# Patient Record
Sex: FEMALE | Race: WHITE | Employment: FULL TIME | URBAN - METROPOLITAN AREA
[De-identification: names, ages, dates, MRNs, and addresses within clinical notes are randomized per-mention and may not be internally consistent; named-entity substitution may affect disease eponyms.]

---

## 2020-08-24 ENCOUNTER — TELEPHONE (OUTPATIENT)
Dept: INTERNAL MEDICINE CLINIC | Age: 60
End: 2020-08-24

## 2020-08-24 RX ORDER — ONDANSETRON 4 MG/1
4 TABLET, ORALLY DISINTEGRATING ORAL
Qty: 15 TAB | Refills: 2 | Status: SHIPPED | OUTPATIENT
Start: 2020-08-24 | End: 2021-02-19 | Stop reason: ALTCHOICE

## 2020-10-19 ENCOUNTER — OFFICE VISIT (OUTPATIENT)
Dept: INTERNAL MEDICINE CLINIC | Age: 60
End: 2020-10-19
Payer: COMMERCIAL

## 2020-10-19 VITALS
BODY MASS INDEX: 42.51 KG/M2 | DIASTOLIC BLOOD PRESSURE: 98 MMHG | TEMPERATURE: 97.6 F | WEIGHT: 249 LBS | HEIGHT: 64 IN | RESPIRATION RATE: 22 BRPM | SYSTOLIC BLOOD PRESSURE: 150 MMHG

## 2020-10-19 DIAGNOSIS — R03.0 ELEVATED BLOOD PRESSURE READING: Primary | ICD-10-CM

## 2020-10-19 PROCEDURE — 99213 OFFICE O/P EST LOW 20 MIN: CPT | Performed by: INTERNAL MEDICINE

## 2020-10-19 RX ORDER — LEVOTHYROXINE SODIUM 100 UG/1
1 TABLET ORAL DAILY
COMMUNITY
Start: 2020-08-07 | End: 2021-05-24

## 2020-10-19 RX ORDER — ASPIRIN 325 MG
325 TABLET ORAL DAILY
COMMUNITY
End: 2022-07-01 | Stop reason: ALTCHOICE

## 2020-10-19 RX ORDER — LOSARTAN POTASSIUM AND HYDROCHLOROTHIAZIDE 12.5; 1 MG/1; MG/1
1 TABLET ORAL DAILY
Qty: 30 TAB | Refills: 5 | Status: SHIPPED | OUTPATIENT
Start: 2020-10-19 | End: 2021-02-19

## 2020-10-19 RX ORDER — PANTOPRAZOLE SODIUM 40 MG/1
1 TABLET, DELAYED RELEASE ORAL DAILY
COMMUNITY
Start: 2020-10-01 | End: 2021-04-19

## 2020-10-19 NOTE — PROGRESS NOTES
Prem Montes presents today for   Chief Complaint   Patient presents with    Thyroid Problem       Is someone accompanying this pt? no    Is the patient using any DME equipment during [address]? no    Depression Screening:  3 most recent PHQ Screens 10/19/2020   Little interest or pleasure in doing things Not at all   Feeling down, depressed, irritable, or hopeless Not at all   Total Score PHQ 2 0       Learning Assessment:  Learning Assessment 10/19/2020   PRIMARY LEARNER Patient   HIGHEST LEVEL OF EDUCATION - PRIMARY LEARNER  DID NOT GRADUATE [facility] PRIMARY LEARNER NONE   CO-LEARNER CAREGIVER No   PRIMARY LANGUAGE ENGLISH   LEARNER PREFERENCE PRIMARY DEMONSTRATION   ANSWERED BY patient   RELATIONSHIP SELF       Abuse Screening:  Abuse Screening Questionnaire 10/19/2020   Do you ever feel afraid of your partner? N   Are you in a relationship with someone who physically or mentally threatens you? N   Is it safe for you to go home? Y       Fall Risk  Fall Risk Assessment, last 12 mths 10/19/2020   Able to walk? Yes   Fall in past 12 months? No       ADL  ADL Assessment 10/19/2020   Feeding yourself No Help Needed   Getting from bed to chair No Help Needed   Getting dressed No Help Needed   Bathing or showering No Help Needed   Walk across the room (includes cane/walker) No Help Needed   Using the telphone No Help Needed   Taking your medications No Help Needed   Preparing meals No Help Needed   Managing money (expenses/bills) No Help Needed   Moderately strenuous housework (laundry) No Help Needed   Shopping for personal items (toiletries/medicines) No Help Needed   Shopping for groceries No Help Needed   Driving No Help Needed   Climbing a flight of stairs No Help Needed   Getting to places beyond walking distances No Help Needed       Health Maintenance reviewed and discussed and ordered per Provider.     Health Maintenance Due   Topic Date Due    Hepatitis C Screening  1960    DTaP/Tdap/Td series (1 - Tdap) 04/09/1979    PAP AKA CERVICAL CYTOLOGY  04/09/1979    Lipid Screen  04/09/1998    Shingrix Vaccine Age 50> (1 of 2) 04/09/2008    Breast Cancer Screen Mammogram  04/09/2008    FOBT Q1Y Age 50-75  04/09/2008    Flu Vaccine (1) 09/01/2020   . Coordination of Care:  1. Have you been to the ER, urgent care clinic since your last visit? Hospitalized since your last visit? yes    2. Have you seen or consulted any other health care providers outside of the [address] [place] [address] since your last visit? Include any pap smears or colon screening.  no

## 2020-10-19 NOTE — PROGRESS NOTES
Angélica Wilkinson is a 56 y.o. female presenting for           Chief Complaint   Patient presents with    Thyroid Problem        HPI Beautruby Argueta comes in today for routine follow-up but she is having some problems with her lower back. He has recently recovered from a bout with COVID-19. She went to the emergency room and had to get some fluids. Fortunately she is better and back at work. She says the only residual she has is when she takes a deep breath she feels like her lungs are restricted somehow. She is not really short of breath and has no dyspnea on exertion. She is able to do her work. She would like to take something for her low back pain and degenerative back disease and arthritis. She took diclofenac before with some relief but not complete and wonders if there is anything else in any stronger. I would like to have started her on some naproxen would be easiest on her blood pressure and so forth but her blood pressure remained consistently high during the office visit. No past medical history on file. Current Outpatient Medications on File Prior to Visit   Medication Sig Dispense Refill    aspirin (ASPIRIN) 325 mg tablet Take 325 mg by mouth daily.  levothyroxine (SYNTHROID) 100 mcg tablet Take 1 Tab by mouth daily.  pantoprazole (PROTONIX) 40 mg tablet Take 1 Tab by mouth daily.  ondansetron (ZOFRAN ODT) 4 mg disintegrating tablet Take 1 Tab by mouth every eight (8) hours as needed for Nausea or Vomiting. 15 Tab 2     No current facility-administered medications on file prior to visit. ROS all systems are reviewed and negative except as noted in the history of present illness.     Visit Vitals  BP (!) 150/98 (BP 1 Location: Left arm, BP Patient Position: Sitting)   Temp 97.6 °F (36.4 °C)   Resp 22   Ht 5' 4\" (1.626 m)   Wt 249 lb (112.9 kg)   BMI 42.74 kg/m²        Physical Exam obese  woman alert and oriented no acute distress normocephalic Activa pink sclera anicteric oral mucosa not examined neck no lymphadenopathy lungs were clear to auscultation heart rhythm regular trace ankle edema. Assessment & Plan  Little concerned that her blood pressure is elevated I am going to start her on ARB as well as a mild diuretic. I will recheck her in a month. At that point we will consider giving her something for her arthritis.

## 2020-11-20 ENCOUNTER — OFFICE VISIT (OUTPATIENT)
Dept: INTERNAL MEDICINE CLINIC | Age: 60
End: 2020-11-20
Payer: COMMERCIAL

## 2020-11-20 VITALS
WEIGHT: 255 LBS | HEART RATE: 98 BPM | BODY MASS INDEX: 43.54 KG/M2 | HEIGHT: 64 IN | DIASTOLIC BLOOD PRESSURE: 80 MMHG | RESPIRATION RATE: 20 BRPM | SYSTOLIC BLOOD PRESSURE: 118 MMHG

## 2020-11-20 DIAGNOSIS — I10 ESSENTIAL HYPERTENSION: ICD-10-CM

## 2020-11-20 DIAGNOSIS — M54.9 BACK PAIN, UNSPECIFIED BACK LOCATION, UNSPECIFIED BACK PAIN LATERALITY, UNSPECIFIED CHRONICITY: Primary | ICD-10-CM

## 2020-11-20 PROCEDURE — 99213 OFFICE O/P EST LOW 20 MIN: CPT | Performed by: INTERNAL MEDICINE

## 2020-11-20 RX ORDER — IBUPROFEN 200 MG
4 TABLET ORAL DAILY
COMMUNITY
End: 2021-03-30

## 2020-11-20 NOTE — PROGRESS NOTES
Cathi Quezada presents today for   Chief Complaint   Patient presents with    Hypertension       Is someone accompanying this pt? no    Is the patient using any DME equipment during OV? no    Depression Screening:  3 most recent PHQ Screens 11/20/2020   Little interest or pleasure in doing things Not at all   Feeling down, depressed, irritable, or hopeless Not at all   Total Score PHQ 2 0       Learning Assessment:  Learning Assessment 11/20/2020   PRIMARY LEARNER Patient   HIGHEST LEVEL OF EDUCATION - PRIMARY LEARNER  DID NOT GRADUATE HIGH SCHOOL   BARRIERS PRIMARY LEARNER NONE   CO-LEARNER CAREGIVER No   PRIMARY LANGUAGE ENGLISH   LEARNER PREFERENCE PRIMARY DEMONSTRATION   ANSWERED BY patient   RELATIONSHIP SELF       Abuse Screening:  Abuse Screening Questionnaire 11/20/2020   Do you ever feel afraid of your partner? N   Are you in a relationship with someone who physically or mentally threatens you? N   Is it safe for you to go home? Y       Fall Risk  Fall Risk Assessment, last 12 mths 11/20/2020   Able to walk? Yes   Fall in past 12 months? No       ADL  ADL Assessment 11/20/2020   Feeding yourself No Help Needed   Getting from bed to chair No Help Needed   Getting dressed No Help Needed   Bathing or showering No Help Needed   Walk across the room (includes cane/walker) No Help Needed   Using the telphone No Help Needed   Taking your medications No Help Needed   Preparing meals No Help Needed   Managing money (expenses/bills) No Help Needed   Moderately strenuous housework (laundry) No Help Needed   Shopping for personal items (toiletries/medicines) No Help Needed   Shopping for groceries No Help Needed   Driving No Help Needed   Climbing a flight of stairs No Help Needed   Getting to places beyond walking distances No Help Needed       Health Maintenance reviewed and discussed and ordered per Provider.     Health Maintenance Due   Topic Date Due    Hepatitis C Screening  1960    DTaP/Tdap/Td series (1 - Tdap) 04/09/1979    PAP AKA CERVICAL CYTOLOGY  04/09/1979    Lipid Screen  04/09/1998    Shingrix Vaccine Age 50> (1 of 2) 04/09/2008    Colorectal Cancer Screening Combo  04/09/2008    Breast Cancer Screen Mammogram  04/09/2008    Flu Vaccine (1) 09/01/2020   . Coordination of Care:  1. Have you been to the ER, urgent care clinic since your last visit? Hospitalized since your last visit? no    2. Have you seen or consulted any other health care providers outside of the [address] [place] [address] since your last visit? Include any pap smears or colon screening.  no

## 2020-11-20 NOTE — PROGRESS NOTES
Kavita Woods is a 56 y.o. female presenting for hypertension follow-up        Chief Complaint   Patient presents with    Hypertension    Back Pain        HPI Jose Duron comes in and by her report her blood pressure is dramatically improved on losartan HCT. She says she is getting systolics in the 628 range. She says early on she might of had a little bit of dizziness and orthostasis but that has resolved and she feels very well. With regard to her low back pain that continues and it is a major issue right now. She works on the [address] [employer] at [employer] she is creaky when she first gets up out of bed but by the time the middle of the day rolls around particularly if she is done a lot of vacuuming having increased low back pain radiating down both legs. She continues to do her home exercise program as instructed by the physical therapists. She has not had bladder or bowel dysfunction but she would be interested in a referral.  No past medical history on file. Current Outpatient Medications on File Prior to Visit   Medication Sig Dispense Refill    ibuprofen (MOTRIN) 200 mg tablet Take 4 Tabs by mouth daily.  aspirin (ASPIRIN) 325 mg tablet Take 325 mg by mouth daily.  levothyroxine (SYNTHROID) 100 mcg tablet Take 1 Tab by mouth daily.  pantoprazole (PROTONIX) 40 mg tablet Take 1 Tab by mouth daily.  losartan-hydroCHLOROthiazide (HYZAAR) 100-12.5 mg per tablet Take 1 Tab by mouth daily for 180 days. 30 Tab 5    ondansetron (ZOFRAN ODT) 4 mg disintegrating tablet Take 1 Tab by mouth every eight (8) hours as needed for Nausea or Vomiting. 15 Tab 2     No current facility-administered medications on file prior to visit.          ROS except as noted in the history of present illness all systems are reviewed and negative  Visit Vitals  /80 (BP 1 Location: Right arm, BP Patient Position: Sitting)   Pulse 98   Resp 20   Ht 5' 4\" (1.626 m)   Wt 255 lb (115.7 kg)   BMI 43.77 kg/m²        Physical Exam well-developed overweight  woman alert oriented cooperative no acute distress normocephalic conjunctiva pink sclera anicteric neck supple no lymphadenopathy lungs bilaterally clear heart regular rhythm no gallops or extrasystoles oxygen saturation is 98%      Assessment & Plan she still feels like she is having some effects from her bout with COVID-19. Fortunately she has recovered in every other way her other than dyspnea on exertion. She just does not feel like she is quite gotten back her usual lung capacity. I explained to her that it would probably be a long time. As noted she is interested in a referral and I am going to send a referral to Dr. Dane Kirk. I will recheck her blood pressure in about 3 months.

## 2021-01-04 ENCOUNTER — OFFICE VISIT (OUTPATIENT)
Dept: ORTHOPEDIC SURGERY | Age: 61
End: 2021-01-04
Payer: COMMERCIAL

## 2021-01-04 VITALS
DIASTOLIC BLOOD PRESSURE: 90 MMHG | TEMPERATURE: 97.2 F | BODY MASS INDEX: 48.21 KG/M2 | HEIGHT: 62 IN | OXYGEN SATURATION: 100 % | RESPIRATION RATE: 17 BRPM | WEIGHT: 262 LBS | SYSTOLIC BLOOD PRESSURE: 192 MMHG | HEART RATE: 85 BPM

## 2021-01-04 DIAGNOSIS — M51.36 DDD (DEGENERATIVE DISC DISEASE), LUMBAR: ICD-10-CM

## 2021-01-04 DIAGNOSIS — M43.10 SPONDYLOLISTHESIS, ACQUIRED: ICD-10-CM

## 2021-01-04 DIAGNOSIS — M54.16 LUMBAR NEURITIS: ICD-10-CM

## 2021-01-04 DIAGNOSIS — M47.817 LUMBOSACRAL SPONDYLOSIS WITHOUT MYELOPATHY: ICD-10-CM

## 2021-01-04 DIAGNOSIS — M54.50 LOW BACK PAIN AT MULTIPLE SITES: Primary | ICD-10-CM

## 2021-01-04 PROCEDURE — 99204 OFFICE O/P NEW MOD 45 MIN: CPT | Performed by: PHYSICAL MEDICINE & REHABILITATION

## 2021-01-04 PROCEDURE — 72100 X-RAY EXAM L-S SPINE 2/3 VWS: CPT | Performed by: PHYSICAL MEDICINE & REHABILITATION

## 2021-01-04 RX ORDER — NAPROXEN 500 MG/1
500 TABLET ORAL 2 TIMES DAILY WITH MEALS
Qty: 30 TAB | Refills: 0 | Status: SHIPPED | OUTPATIENT
Start: 2021-01-04 | End: 2021-02-19 | Stop reason: ALTCHOICE

## 2021-01-04 RX ORDER — METHYLPREDNISOLONE 4 MG/1
TABLET ORAL
Qty: 1 DOSE PACK | Refills: 0 | Status: SHIPPED | OUTPATIENT
Start: 2021-01-04 | End: 2021-02-19 | Stop reason: ALTCHOICE

## 2021-01-04 NOTE — PROGRESS NOTES
[facility]  [address], [address]  Phone: [phone]  Fax: [phone]        INITIAL CONSULTATION      HISTORY OF PRESENT ILLNESS:  Martin Barros is a 56 y.o. female whom is referred from Melani Smith MD secondary to progressive lower back pain radiating into the BLE (RLE>LLE) in a S1 distribution to the feet x 6 years without trauma. She rates her pain 3-9/10. Her pain is exacerbated by lying down. Her pain is relieved with sitting. Additionally, she reports paraesthesias in her right hand. She has treated with Ibuprofen without benefit. Pt completed PT in 2019 with benefit. She performs her HEP 3 x/week. Patient denies previous spinal surgery, injections, or chiropractic care. Pt denies fever, weight loss, or skin changes. Pt denies change in bowel or bladder habits. PmHx of reflux, hypothyroidism. Note from Melani Smith MD dated 2020 indicating patient was seen with c/o lower back pain radiating into the BLE. Pain is worse with activity. She continues with her HEP. Referred to spine.  reviewed. Body mass index is 47.92 kg/m². PCP: Melani Smith MD    Past Medical History:   Diagnosis Date    GERD (gastroesophageal reflux disease)     Hypothyroidism         Past Surgical History:   Procedure Laterality Date    HX CATARACT REMOVAL      HX PARTIAL HYSTERECTOMY      HX WRIST FRACTURE TX Left     HX WRIST FRACTURE TX Left         Tobacco Use    Smoking status: Former Smoker     Quit date: 2020     Years since quittin.8    Smokeless tobacco: Never Used   Substance Use Topics    Alcohol use: Never     Frequency: Never       Work status: The patient is employed. Marital status: .      Current Outpatient Medications   Medication Sig Dispense Refill    methylPREDNISolone (MEDROL DOSEPACK) 4 mg tablet Per dose pack instructions 1 Dose Pack 0    naproxen (NAPROSYN) 500 mg tablet Take 1 Tab by mouth two (2) times daily (with meals). 30 Tab 0    aspirin (ASPIRIN) 325 mg tablet Take 325 mg by mouth daily.  levothyroxine (SYNTHROID) 100 mcg tablet Take 1 Tab by mouth daily.  pantoprazole (PROTONIX) 40 mg tablet Take 1 Tab by mouth daily.  ibuprofen (MOTRIN) 200 mg tablet Take 4 Tabs by mouth daily.  losartan-hydroCHLOROthiazide (HYZAAR) 100-12.5 mg per tablet Take 1 Tab by mouth daily for 180 days. 30 Tab 5    ondansetron (ZOFRAN ODT) 4 mg disintegrating tablet Take 1 Tab by mouth every eight (8) hours as needed for Nausea or Vomiting. 15 Tab 2       Allergies   Allergen Reactions    Sulfa (Sulfonamide Antibiotics) Shortness of Breath          History reviewed. No pertinent family history. REVIEW OF SYSTEMS  Constitutional symptoms: Negative  Eyes: Negative  Ears, Nose, Throat, and Mouth: Negative  Cardiovascular: Negative  Respiratory: Negative  Genitourinary: Negative  Integumentary (Skin and/or breast): Negative  Musculoskeletal: Positive for low back pain radiating into the BLE. Extremities: Negative for edema. Endocrine/Rheumatologic: Negative  Hematologic/Lymphatic: Negative  Allergic/Immunologic: Negative  Psychiatric: Negative       PHYSICAL EXAMINATION  Visit Vitals  BP (!) 192/90 (BP 1 Location: Left arm)   Pulse 85   Temp 97.2 °F (36.2 °C)   Resp 17   Ht 5' 2\" (1.575 m)   Wt 262 lb (118.8 kg)   SpO2 100%   BMI 47.92 kg/m²       CONSTITUTIONAL: NAD, A&O x 3  HEART: Regular rate and rhythm  GASTROINTESTINAL: Positive bowel sounds, soft, nontender, and nondistended  LUNGS: Clear to auscultation bilaterally. SKIN: Negative for rash. RANGE OF MOTION: The patient has full passive range of motion in all four extremities. SENSATION: Decreased sensation to light touch on the RLE in a S1 distribution. Otherwise, sensation is intact to light touch throughout.   MOTOR:   Straight Leg Raise: Negative, bilateral  Caruso: Negative, bilateral  Deep tendon reflexes are 0 at the biceps, triceps, and brachioradialis bilaterally. Deep tendon reflexes are 1 at the knees and 0 at the ankles bilaterally. Shoulder AB/Flex Elbow Flex Wrist Ext Elbow Ext Wrist Flex Hand Intrin Tone   Right +4/5 +4/5 +4/5 +4/5 +4/5 +4/5 +4/5   Left +4/5 +4/5 +4/5 +4/5 +4/5 +4/5 +4/5              Hip Flex Knee Ext Knee Flex Ankle DF GTE Ankle PF Tone   Right +4/5 +4/5 +4/5 +4/5 +4/5 +4/5 +4/5   Left +4/5 +4/5 +4/5 +4/5 +4/5 +4/5 +4/5     RADIOGRAPHS  L spine plain films dated 1/4/2021 revealed:  Very mild lumbar scoliosis extending into the thoracic spine. Mild disc space narrowing at L4-5. Small anterior osteophytes noted in the upper lumbar spine. Grade 1-2 anterolisthesis of L4 on L5. No acute pathology identified. These are being sent to Dr. Pola Johnson for official reading. ASSESSMENT   Diagnoses and all orders for this visit:    1. Low back pain at multiple sites  -     AMB POC XRAY, SPINE, LUMBOSACRAL; 2 O  -     methylPREDNISolone (MEDROL DOSEPACK) 4 mg tablet; Per dose pack instructions  -     naproxen (NAPROSYN) 500 mg tablet; Take 1 Tab by mouth two (2) times daily (with meals). -     REFERRAL TO PHYSICAL THERAPY    2. Lumbosacral spondylosis without myelopathy  -     methylPREDNISolone (MEDROL DOSEPACK) 4 mg tablet; Per dose pack instructions  -     naproxen (NAPROSYN) 500 mg tablet; Take 1 Tab by mouth two (2) times daily (with meals). -     REFERRAL TO PHYSICAL THERAPY    3. Lumbar neuritis  -     methylPREDNISolone (MEDROL DOSEPACK) 4 mg tablet; Per dose pack instructions  -     naproxen (NAPROSYN) 500 mg tablet; Take 1 Tab by mouth two (2) times daily (with meals). -     REFERRAL TO PHYSICAL THERAPY    4. DDD (degenerative disc disease), lumbar  -     methylPREDNISolone (MEDROL DOSEPACK) 4 mg tablet; Per dose pack instructions  -     naproxen (NAPROSYN) 500 mg tablet; Take 1 Tab by mouth two (2) times daily (with meals).   -     REFERRAL TO PHYSICAL THERAPY    5. Spondylolisthesis, acquired  - methylPREDNISolone (MEDROL DOSEPACK) 4 mg tablet; Per dose pack instructions  -     naproxen (NAPROSYN) 500 mg tablet; Take 1 Tab by mouth two (2) times daily (with meals). -     REFERRAL TO PHYSICAL THERAPY         IMPRESSIONS/RECOMMENDATIONS:  Patient presents today with c/o  lower back pain radiating into the BLE (RLE>LLE) in a S1 distribution to the feet. Multiple treatment options were discussed. I offered neuropathic medication, pt deferred. I will refer her to physical therapy with an emphasis on HEP. I will start her on Medrol Dosepak followed by Naprosyn 500 mg BID. Patient is neurologically intact. I will see the patient back in 6 week's time or earlier if needed. Written by Wendy Knott, as dictated by Waylan Goltz, MD  I examined the patient, reviewed and agree with the note.

## 2021-01-04 NOTE — LETTER
1/4/2021 Patient: Erin Pedraza YOB: 1960 Date of Visit: 1/4/2021 Jose R Armijo MD 
[address] [place], [place] [id number] [facility] [id number] [address] [employer] Dear Jose R Armijo MD, Thank you for referring Ms. Karmen Garnett to [address] for evaluation. My notes for this consultation are attached. If you have questions, please do not hesitate to call me. I look forward to following your patient along with you. Sincerely, Jae Cortes MD

## 2021-02-15 ENCOUNTER — OFFICE VISIT (OUTPATIENT)
Dept: ORTHOPEDIC SURGERY | Age: 61
End: 2021-02-15
Payer: COMMERCIAL

## 2021-02-15 ENCOUNTER — TELEPHONE (OUTPATIENT)
Dept: ORTHOPEDIC SURGERY | Age: 61
End: 2021-02-15

## 2021-02-15 VITALS
WEIGHT: 265 LBS | RESPIRATION RATE: 17 BRPM | HEIGHT: 63 IN | SYSTOLIC BLOOD PRESSURE: 166 MMHG | OXYGEN SATURATION: 99 % | HEART RATE: 63 BPM | BODY MASS INDEX: 46.95 KG/M2 | TEMPERATURE: 98.6 F | DIASTOLIC BLOOD PRESSURE: 98 MMHG

## 2021-02-15 DIAGNOSIS — M54.50 LOW BACK PAIN AT MULTIPLE SITES: Primary | ICD-10-CM

## 2021-02-15 DIAGNOSIS — M54.16 LUMBAR NEURITIS: ICD-10-CM

## 2021-02-15 DIAGNOSIS — R32 URINARY INCONTINENCE, UNSPECIFIED TYPE: ICD-10-CM

## 2021-02-15 DIAGNOSIS — M47.817 LUMBOSACRAL SPONDYLOSIS WITHOUT MYELOPATHY: ICD-10-CM

## 2021-02-15 DIAGNOSIS — M43.10 SPONDYLOLISTHESIS, ACQUIRED: ICD-10-CM

## 2021-02-15 DIAGNOSIS — M51.36 DDD (DEGENERATIVE DISC DISEASE), LUMBAR: ICD-10-CM

## 2021-02-15 PROCEDURE — 99213 OFFICE O/P EST LOW 20 MIN: CPT | Performed by: PHYSICAL MEDICINE & REHABILITATION

## 2021-02-15 NOTE — PROGRESS NOTES
[facility]  [address], [address]   Phone: [phone]  Fax: [phone]        PROGRESS NOTE      HISTORY OF PRESENT ILLNESS:  The patient is a 56 y.o. female and was seen today for follow up of progressive lower back pain radiating into the BLE (RLE>LLE) in a S1 distribution to the feet x 6 years without trauma. Her pain is exacerbated by lying down. Her pain is relieved with sitting. Additionally, she reports paraesthesias in her right hand. She has treated with Ibuprofen without benefit. Pt completed PT in 2019 with benefit. She performs her HEP 3 x/week. Patient denies previous spinal surgery, injections, or chiropractic care. Pt denies fever, weight loss, or skin changes. PmHx of reflux, hypothyroidism. Note from Marley Smith MD dated 11/20/2020 indicating patient was seen with c/o lower back pain radiating into the BLE. Pain is worse with activity. She continues with her HEP. Referred to spine. L spine plain films dated 1/4/2021 revealed: Very mild lumbar scoliosis extending into the thoracic spine. Mild disc space narrowing at L4-5. Small anterior osteophytes noted in the upper lumbar spine. Grade 1-2 anterolisthesis of L4 on L5. No acute pathology identified. At her last clinical appointment, I offered neuropathic medication, pt deferred. I referred her to physical therapy with an emphasis on HEP. I started her on Medrol Dosepak followed by Naprosyn 500 mg BID. The patient returns today and reports pain location and distribution remains unchanged. She rates her pain 4-9/10, previously 3-9/10. She completed the MDP without benefit. Therapy notes reviewed. She completed PT with minimal benefit. Pt reports bladder urgency x 2 weeks. Pt denies change in bowel habits.  reviewed. Body mass index is 46.94 kg/m².     PCP: Marley Smith MD      Past Medical History:   Diagnosis Date    GERD (gastroesophageal reflux disease)     Hypothyroidism         Social History     Socioeconomic History    Marital status:      Spouse name: Not on file    Number of children: Not on file    Years of education: Not on file    Highest education level: Not on file   Occupational History    Not on file   Social Needs    Financial resource strain: Not on file    Food insecurity     Worry: Not on file     Inability: Not on file    Transportation needs     Medical: Not on file     Non-medical: Not on file   Tobacco Use    Smoking status: Former Smoker     Quit date: 2020     Years since quittin.9    Smokeless tobacco: Never Used   Substance and Sexual Activity    Alcohol use: Never     Frequency: Never    Drug use: Never    Sexual activity: Not on file   Lifestyle    Physical activity     Days per week: Not on file     Minutes per session: Not on file    Stress: Not on file   Relationships    Social connections     Talks on phone: Not on file     Gets together: Not on file     Attends Jain service: Not on file     Active member of club or organization: Not on file     Attends meetings of clubs or organizations: Not on file     Relationship status: Not on file    Intimate partner violence     Fear of current or ex partner: Not on file     Emotionally abused: Not on file     Physically abused: Not on file     Forced sexual activity: Not on file   Other Topics Concern    Not on file   Social History Narrative    Not on file       Current Outpatient Medications   Medication Sig Dispense Refill    ibuprofen (MOTRIN) 200 mg tablet Take 4 Tabs by mouth daily.  aspirin (ASPIRIN) 325 mg tablet Take 325 mg by mouth daily.  levothyroxine (SYNTHROID) 100 mcg tablet Take 1 Tab by mouth daily.  pantoprazole (PROTONIX) 40 mg tablet Take 1 Tab by mouth daily.       methylPREDNISolone (MEDROL DOSEPACK) 4 mg tablet Per dose pack instructions 1 Dose Pack 0    naproxen (NAPROSYN) 500 mg tablet Take 1 Tab by mouth two (2) times daily (with meals). 30 Tab 0    losartan-hydroCHLOROthiazide (HYZAAR) 100-12.5 mg per tablet Take 1 Tab by mouth daily for 180 days. 30 Tab 5    ondansetron (ZOFRAN ODT) 4 mg disintegrating tablet Take 1 Tab by mouth every eight (8) hours as needed for Nausea or Vomiting. 15 Tab 2       Allergies   Allergen Reactions    Sulfa (Sulfonamide Antibiotics) Shortness of Breath          PHYSICAL EXAMINATION    Visit Vitals  BP (!) 166/98 (BP 1 Location: Left upper arm)   Pulse 63   Temp 98.6 °F (37 °C)   Resp 17   Ht 5' 3\" (1.6 m)   Wt 265 lb (120.2 kg)   SpO2 99%   BMI 46.94 kg/m²       CONSTITUTIONAL: NAD, A&O x 3  SENSATION: Decreased sensation to light touch on the RLE in a L4 and S1 distribution. Otherwise, intact to light touch throughout  RANGE OF MOTION: The patient has full passive range of motion in all four extremities. MOTOR:  Straight Leg Raise: Negative, bilateral               Hip Flex Knee Ext Knee Flex Ankle DF GTE Ankle PF Tone   Right +4/5 +4/5 +4/5 +4/5 +4/5 +4/5 +4/5   Left +4/5 +4/5 +4/5 +4/5 +4/5 +4/5 +4/5       ASSESSMENT   Diagnoses and all orders for this visit:    1. Low back pain at multiple sites  -     MRI LUMB SPINE WO CONT; Future    2. Lumbosacral spondylosis without myelopathy  -     MRI LUMB SPINE WO CONT; Future    3. Lumbar neuritis  -     MRI LUMB SPINE WO CONT; Future    4. DDD (degenerative disc disease), lumbar  -     MRI LUMB SPINE WO CONT; Future    5. Spondylolisthesis, acquired  -     MRI LUMB SPINE WO CONT; Future    6. Urinary incontinence, unspecified type  -     MRI LUMB SPINE WO CONT; Future        IMPRESSION AND PLAN:  Patient returns to the office today with c/o lower back pain radiating into the BLE (RLE>LLE) in a S1 distribution to the feet. Multiple treatment options were discussed. I offered neuropathic medication, pt declined. Pt reports bladder urgency x 2 weeks. I will order a L spine MRI. I advised patient to bring copies of films to next visit.  I recommended she increase the frequency of HEP to daily. I will see the patient back following the MRI or earlier if needed. Written by Penelope Chaudhary, as dictated by Jordy Samuels MD  I examined the patient, reviewed and agree with the note.

## 2021-02-15 NOTE — LETTER
2/15/2021 Patient: Dwaine Johnson YOB: 1960 Date of Visit: 2/15/2021 Torin Avalos MD 
[address] [place], [place] [id number] [address] [place] [id number] [address] [employer] Dear Torin Avalos MD, Thank you for referring Ms. Ace Montesinos to [address] for evaluation. My notes for this consultation are attached. If you have questions, please do not hesitate to call me. I look forward to following your patient along with you. Sincerely, Kristofer Pradhan MD

## 2021-02-15 NOTE — TELEPHONE ENCOUNTER
MRI of the Lumbar Spine without contrast is scheduled at [address] [place] [address] , [phone], on 02/18/21.  [facility] pre-authorization [id number], effective 02/15/21-03/16/21

## 2021-02-19 ENCOUNTER — OFFICE VISIT (OUTPATIENT)
Dept: INTERNAL MEDICINE CLINIC | Age: 61
End: 2021-02-19
Payer: COMMERCIAL

## 2021-02-19 VITALS
BODY MASS INDEX: 47.66 KG/M2 | HEART RATE: 76 BPM | DIASTOLIC BLOOD PRESSURE: 80 MMHG | TEMPERATURE: 97.7 F | WEIGHT: 269 LBS | SYSTOLIC BLOOD PRESSURE: 128 MMHG | HEIGHT: 63 IN | RESPIRATION RATE: 20 BRPM

## 2021-02-19 DIAGNOSIS — R03.0 ELEVATED BLOOD PRESSURE READING: ICD-10-CM

## 2021-02-19 DIAGNOSIS — Z12.4 PAP SMEAR FOR CERVICAL CANCER SCREENING: Primary | ICD-10-CM

## 2021-02-19 DIAGNOSIS — M54.9 BACK PAIN, UNSPECIFIED BACK LOCATION, UNSPECIFIED BACK PAIN LATERALITY, UNSPECIFIED CHRONICITY: ICD-10-CM

## 2021-02-19 PROCEDURE — 99213 OFFICE O/P EST LOW 20 MIN: CPT | Performed by: INTERNAL MEDICINE

## 2021-02-19 NOTE — PROGRESS NOTES
[facility]  [address], [place]   Phone: [phone]  Fax: [phone]        PROGRESS NOTE      HISTORY OF PRESENT ILLNESS:  The patient is a 56 y.o. female and was seen today for follow up of progressive lower back pain radiating into the BLE (RLE>LLE) in a S1 distribution to the feet x 6 years without trauma. Her pain is exacerbated by lying down. Her pain is relieved with sitting. Additionally, she reports paraesthesias in her right hand. She has treated with Ibuprofen without benefit. She completed PT with minimal benefit. She performs her HEP 3 x/week. Patient denies previous spinal surgery, injections, or chiropractic care. Pt denies fever, weight loss, or skin changes. Pt reports bladder urgency x 2 weeks. Pt denies change in bowel habits. PmHx of reflux, hypothyroidism. Note from Melani Smith MD dated 11/20/2020 indicating patient was seen with c/o lower back pain radiating into the BLE. Pain is worse with activity. She continues with her HEP. Referred to spine. L spine plain films dated 1/4/2021 revealed: Very mild lumbar scoliosis extending into the thoracic spine. Mild disc space narrowing at L4-5. Small anterior osteophytes noted in the upper lumbar spine. Grade 1-2 anterolisthesis of L4 on L5. No acute pathology identified. At her last clinical appointment, I offered neuropathic medication, pt declined. Pt reported bladder urgency x 2 weeks. I ordered a L spine MRI. I recommended she increase the frequency of HEP to daily. The patient returns today and reports pain location and distribution remains unchanged. She rates her pain 3-9/10, previously 4-9/10. Since her last appointment, I received results from Radiology. I referred the pt to Dr. Berhane Fritz for surgical evaluation. We were unable to reach the patient and she presents today for f/u. Pt continues to report bladder incontinence. Pt denies change in bowel habits.  L spine MRI dated 2021 films independently reviewed. Per report, multilevel degenerative disc disease superimposed on congenital narrowing of the spinal canal with posterior epidural lipomatosis. At L4-5, there is grade 1 anterolisthesis with severe central canal and mild-to-moderate bilateral foraminal stenosis. At L3-4, moderate central canal and mild-to-moderate bilateral foraminal stenosis. At L2-3, mild-to-moderate congenital central canal stenosis. At L1-2, mild central canal stenosis. Edema within the right sacrum which may reflect sacral insufficiency fracture. This is incompletely imaged. Consider MRI of the sacrum if there are referable symptoms.  reviewed. Body mass index is 46.77 kg/m².     PCP: Marjorie Smith MD      Past Medical History:   Diagnosis Date    GERD (gastroesophageal reflux disease)     Hypothyroidism         Social History     Socioeconomic History    Marital status:      Spouse name: Not on file    Number of children: Not on file    Years of education: Not on file    Highest education level: Not on file   Occupational History    Not on file   Social Needs    Financial resource strain: Not on file    Food insecurity     Worry: Not on file     Inability: Not on file    Transportation needs     Medical: Not on file     Non-medical: Not on file   Tobacco Use    Smoking status: Former Smoker     Packs/day: 1.00     Years: 20.00     Pack years: 20.00     Types: Cigarettes     Start date: 0     Quit date: 2020     Years since quittin.0    Smokeless tobacco: Never Used    Tobacco comment: patient doesn't smoke   Substance and Sexual Activity    Alcohol use: Never     Frequency: Never    Drug use: Never    Sexual activity: Not on file   Lifestyle    Physical activity     Days per week: Not on file     Minutes per session: Not on file    Stress: Not on file   Relationships    Social connections     Talks on phone: Not on file     Gets together: Not on file Attends Yazdanism service: Not on file     Active member of club or organization: Not on file     Attends meetings of clubs or organizations: Not on file     Relationship status: Not on file    Intimate partner violence     Fear of current or ex partner: Not on file     Emotionally abused: Not on file     Physically abused: Not on file     Forced sexual activity: Not on file   Other Topics Concern    Not on file   Social History Narrative    Not on file       Current Outpatient Medications   Medication Sig Dispense Refill    aspirin (ASPIRIN) 325 mg tablet Take 325 mg by mouth daily.  levothyroxine (SYNTHROID) 100 mcg tablet Take 1 Tab by mouth daily.  pantoprazole (PROTONIX) 40 mg tablet Take 1 Tab by mouth daily.  ibuprofen (MOTRIN) 200 mg tablet Take 4 Tabs by mouth daily. Allergies   Allergen Reactions    Sulfa (Sulfonamide Antibiotics) Shortness of Breath          PHYSICAL EXAMINATION    Visit Vitals  BP (!) 174/85 (BP 1 Location: Left upper arm)   Pulse 62   Temp 97.4 °F (36.3 °C)   Resp 18   Ht 5' 3\" (1.6 m)   Wt 264 lb (119.7 kg)   SpO2 100%   BMI 46.77 kg/m²       CONSTITUTIONAL: NAD, A&O x 3  SENSATION: Decreased sensation to light touch on the RLE in a L4 and S1 distribution. Otherwise, intact to light touch throughout  RANGE OF MOTION: The patient has full passive range of motion in all four extremities. MOTOR:  Straight Leg Raise: Negative, bilateral                 Hip Flex Knee Ext Knee Flex Ankle DF GTE Ankle PF Tone   Right +4/5 +4/5 +4/5 +4/5 +4/5 +4/5 +4/5   Left +4/5 +4/5 +4/5 +4/5 +4/5 +4/5 +4/5       ASSESSMENT   Diagnoses and all orders for this visit:    1.  Spinal stenosis of lumbar region, unspecified whether neurogenic claudication present  -     REFERRAL TO SPINE SURGERY    2. DDD (degenerative disc disease), lumbar  -     REFERRAL TO SPINE SURGERY    3. Urinary incontinence, unspecified type  -     REFERRAL TO SPINE SURGERY    4. Low back pain at multiple sites  -     REFERRAL TO SPINE SURGERY    5. Lumbosacral spondylosis without myelopathy  -     REFERRAL TO SPINE SURGERY    6. Lumbar neuritis  -     REFERRAL TO SPINE SURGERY    7. Spondylolisthesis, acquired  -     REFERRAL TO SPINE SURGERY        IMPRESSION AND PLAN:  Patient returns to the office today with c/o  progressive lower back pain radiating into the BLE (RLE>LLE) in a S1 distribution to the feet. Multiple treatment options were discussed. Pt with bladder incontinence x 3 weeks. I will refer her to Dr. Li Cook for surgical evaluation. I advised the pt to bring her lumbar spine MRI films to the visit. I will see the patient back prn. Written by April Stevens, as dictated by Juan Logan MD  I examined the patient, reviewed and agree with the note.

## 2021-02-19 NOTE — PROGRESS NOTES
Prakash Yang presents today for No chief complaint on file. Is someone accompanying this pt? no    Is the patient using any DME equipment during [address]? no    Depression Screening:  3 most recent PHQ Screens 2/19/2021   Little interest or pleasure in doing things Not at all   Feeling down, depressed, irritable, or hopeless Not at all   Total Score PHQ 2 0       Learning Assessment:  Learning Assessment 11/20/2020   PRIMARY LEARNER Patient   HIGHEST LEVEL OF EDUCATION - PRIMARY LEARNER  DID NOT GRADUATE HIGH SCHOOL   BARRIERS PRIMARY LEARNER NONE   CO-LEARNER CAREGIVER No   PRIMARY LANGUAGE ENGLISH   LEARNER PREFERENCE PRIMARY DEMONSTRATION   ANSWERED BY patient   RELATIONSHIP SELF       Abuse Screening:  Abuse Screening Questionnaire 2/19/2021   Do you ever feel afraid of your partner? N   Are you in a relationship with someone who physically or mentally threatens you? N   Is it safe for you to go home? Y       Fall Risk  Fall Risk Assessment, last 12 mths 2/19/2021   Able to walk? Yes   Fall in past 12 months? 0   Do you feel unsteady? 0   Are you worried about falling 0       ADL  ADL Assessment 2/19/2021   Feeding yourself No Help Needed   Getting from bed to chair No Help Needed   Getting dressed No Help Needed   Bathing or showering No Help Needed   Walk across the room (includes cane/walker) No Help Needed   Using the telphone No Help Needed   Taking your medications No Help Needed   Preparing meals No Help Needed   Managing money (expenses/bills) No Help Needed   Moderately strenuous housework (laundry) No Help Needed   Shopping for personal items (toiletries/medicines) No Help Needed   Shopping for groceries No Help Needed   Driving No Help Needed   Climbing a flight of stairs No Help Needed   Getting to places beyond walking distances No Help Needed       Health Maintenance reviewed and discussed and ordered per Provider.     Health Maintenance Due   Topic Date Due    Hepatitis C Screening 1960    COVID-19 Vaccine (1 of 2) 04/09/1974    DTaP/Tdap/Td series (1 - Tdap) 04/09/1979    PAP AKA CERVICAL CYTOLOGY  04/09/1979    Lipid Screen  04/09/1998    Shingrix Vaccine Age 50> (1 of 2) 04/09/2008    Colorectal Cancer Screening Combo  04/09/2008    Breast Cancer Screen Mammogram  04/09/2008    Flu Vaccine (1) 09/01/2020   . Coordination of Care:  1. Have you been to the ER, urgent care clinic since your last visit? Hospitalized since your last visit? no    2. Have you seen or consulted any other health care providers outside of the [address] [place] [address] since your last visit? Include any pap smears or colon screening.  Dr Noé Altman

## 2021-02-19 NOTE — PROGRESS NOTES
Shari Sow is a 56 y.o. female presenting for follow-up of blood pressure          Chief Complaint   Patient presents with    Hypertension        HPI Landen Ortega comes back and she was here today for blood pressure check. Since she was here she has been seeing Dr. Idania Fontanez who sent her for a course of physical therapy. She has completed that and it might of helped some. She is having an MRI and then seeing him next week. With regard to her blood pressure she took the medication and is she felt like it made her feel dizzy. She felt weak and unsteady at times. Her blood pressure was 90 systolic when she checked it at home and one time got into the 80s. He did not fall or lose consciousness but she just did not feel good. She subsequently has stopped taking the medication and whenever she checks her blood pressure at home her systolic is always in the 120s. She thinks it just goes up when she goes to the doctor's office. She is otherwise asymptomatic    Past Medical History:   Diagnosis Date    GERD (gastroesophageal reflux disease)     Hypothyroidism         Current Outpatient Medications on File Prior to Visit   Medication Sig Dispense Refill    ibuprofen (MOTRIN) 200 mg tablet Take 4 Tabs by mouth daily.  aspirin (ASPIRIN) 325 mg tablet Take 325 mg by mouth daily.  levothyroxine (SYNTHROID) 100 mcg tablet Take 1 Tab by mouth daily.  pantoprazole (PROTONIX) 40 mg tablet Take 1 Tab by mouth daily.  [DISCONTINUED] methylPREDNISolone (MEDROL DOSEPACK) 4 mg tablet Per dose pack instructions 1 Dose Pack 0    [DISCONTINUED] naproxen (NAPROSYN) 500 mg tablet Take 1 Tab by mouth two (2) times daily (with meals). 30 Tab 0    [DISCONTINUED] losartan-hydroCHLOROthiazide (HYZAAR) 100-12.5 mg per tablet Take 1 Tab by mouth daily for 180 days. 30 Tab 5    [DISCONTINUED] ondansetron (ZOFRAN ODT) 4 mg disintegrating tablet Take 1 Tab by mouth every eight (8) hours as needed for Nausea or Vomiting. 15 Tab 2     No current facility-administered medications on file prior to visit. ROS except as noted in the history of present illness all systems are reviewed and negative    Visit Vitals  /80 (BP 1 Location: Left arm, BP Patient Position: Sitting, BP Cuff Size: Large adult)   Pulse 76 Comment: Regular   Temp 97.7 °F (36.5 °C)   Resp 20   Ht 5' 3\" (1.6 m)   Wt 269 lb (122 kg)   BMI 47.65 kg/m²        Physical Exam overweight  woman alert and oriented no acute distress normocephalic conjunctiva pink sclera anicteric neck supple lungs bilaterally clear to auscultation heart rhythm regular no gallops extremities trace ankle edema      Assessment & Plan patient may have whitecoat syndrome but I do not know whether to just pass it off is that yet or not. We did have a discussion about some screening parameters and she had colonoscopy at age 46 and is not due for another one until next year. When Dr. Juarez Parents office had a dispute with [facility] and they stopped excepting her insurance she more or less stopped her routine follow-ups and has never really gotten back into the swing of things there. I told her I thought it would be important to go ahead and get restarted with Pap smears and mammograms and she was willing to accept a referral to  [facility]. Recheck her in a couple of months.

## 2021-02-22 ENCOUNTER — OFFICE VISIT (OUTPATIENT)
Dept: ORTHOPEDIC SURGERY | Age: 61
End: 2021-02-22
Payer: COMMERCIAL

## 2021-02-22 VITALS
SYSTOLIC BLOOD PRESSURE: 174 MMHG | RESPIRATION RATE: 18 BRPM | HEIGHT: 63 IN | OXYGEN SATURATION: 100 % | TEMPERATURE: 97.4 F | BODY MASS INDEX: 46.78 KG/M2 | WEIGHT: 264 LBS | HEART RATE: 62 BPM | DIASTOLIC BLOOD PRESSURE: 85 MMHG

## 2021-02-22 DIAGNOSIS — M43.10 SPONDYLOLISTHESIS, ACQUIRED: ICD-10-CM

## 2021-02-22 DIAGNOSIS — M48.061 SPINAL STENOSIS OF LUMBAR REGION, UNSPECIFIED WHETHER NEUROGENIC CLAUDICATION PRESENT: Primary | ICD-10-CM

## 2021-02-22 DIAGNOSIS — M54.50 LOW BACK PAIN AT MULTIPLE SITES: ICD-10-CM

## 2021-02-22 DIAGNOSIS — M54.16 LUMBAR NEURITIS: ICD-10-CM

## 2021-02-22 DIAGNOSIS — R32 URINARY INCONTINENCE, UNSPECIFIED TYPE: ICD-10-CM

## 2021-02-22 DIAGNOSIS — M47.817 LUMBOSACRAL SPONDYLOSIS WITHOUT MYELOPATHY: ICD-10-CM

## 2021-02-22 DIAGNOSIS — M51.36 DDD (DEGENERATIVE DISC DISEASE), LUMBAR: ICD-10-CM

## 2021-02-22 PROCEDURE — 99214 OFFICE O/P EST MOD 30 MIN: CPT | Performed by: PHYSICAL MEDICINE & REHABILITATION

## 2021-02-22 NOTE — LETTER
2/22/2021 Patient: Anne Marie Alvarenga YOB: 1960 Date of Visit: 2/22/2021 Liane Rueda MD 
[address] [place], [place] [id number] [address] [place] [id number] [address] [employer] Dear Liane Rueda MD, Thank you for referring Ms. Kofi Lee to [address] for evaluation. My notes for this consultation are attached. If you have questions, please do not hesitate to call me. I look forward to following your patient along with you. Sincerely, Queta Spence MD

## 2021-03-15 DIAGNOSIS — M54.16 LUMBAR NEURITIS: ICD-10-CM

## 2021-03-15 DIAGNOSIS — M43.10 SPONDYLOLISTHESIS, ACQUIRED: ICD-10-CM

## 2021-03-15 DIAGNOSIS — M51.36 DDD (DEGENERATIVE DISC DISEASE), LUMBAR: ICD-10-CM

## 2021-03-15 DIAGNOSIS — M47.817 LUMBOSACRAL SPONDYLOSIS WITHOUT MYELOPATHY: ICD-10-CM

## 2021-03-15 DIAGNOSIS — R32 URINARY INCONTINENCE, UNSPECIFIED TYPE: ICD-10-CM

## 2021-03-15 DIAGNOSIS — M54.50 LOW BACK PAIN AT MULTIPLE SITES: ICD-10-CM

## 2021-03-22 ENCOUNTER — OFFICE VISIT (OUTPATIENT)
Dept: INTERNAL MEDICINE CLINIC | Age: 61
End: 2021-03-22
Payer: COMMERCIAL

## 2021-03-22 VITALS
WEIGHT: 260.2 LBS | DIASTOLIC BLOOD PRESSURE: 85 MMHG | TEMPERATURE: 97.8 F | SYSTOLIC BLOOD PRESSURE: 134 MMHG | HEART RATE: 76 BPM | BODY MASS INDEX: 46.09 KG/M2

## 2021-03-22 DIAGNOSIS — E03.9 ACQUIRED HYPOTHYROIDISM: ICD-10-CM

## 2021-03-22 DIAGNOSIS — K21.9 GASTROESOPHAGEAL REFLUX DISEASE WITHOUT ESOPHAGITIS: Primary | ICD-10-CM

## 2021-03-22 PROCEDURE — 99213 OFFICE O/P EST LOW 20 MIN: CPT | Performed by: INTERNAL MEDICINE

## 2021-03-22 NOTE — PROGRESS NOTES
Tiff Lizarraga is a 56 y.o. female presenting for Evaluation prior to planned L for 5 laminectomy. Chief Complaint   Patient presents with    Follow-up        HPI Cailin Cortez is in today for follow-up prior to planned L4-5 laminectomy. She is feeling well and generally having no complaints or problems except for her low back pain radiating down her legs. She feels pretty good when she first gets up in the morning but by 11 AM she is having a lot of pain in really is unable to complete her work. No bladder or bowel dysfunction. She is scheduled for laminectomy in 1 week. Past Medical History:   Diagnosis Date    GERD (gastroesophageal reflux disease)     Hypothyroidism         Current Outpatient Medications on File Prior to Visit   Medication Sig Dispense Refill    ibuprofen (MOTRIN) 200 mg tablet Take 4 Tabs by mouth daily.  aspirin (ASPIRIN) 325 mg tablet Take 325 mg by mouth daily.  levothyroxine (SYNTHROID) 100 mcg tablet Take 1 Tab by mouth daily.  pantoprazole (PROTONIX) 40 mg tablet Take 1 Tab by mouth daily. No current facility-administered medications on file prior to visit. ROS except as noted above all systems are reviewed and negative    There were no vitals taken for this visit. Physical Exam obese  woman alert oriented cooperative no acute distress normocephalic HEENT unremarkable neck no mass lungs clear to auscultation bilaterally heart distant S1 and S2 no gallops or extrasystoles abdomen is not examined extremities trace ankle edema    Assessment & Plan I have reviewed Lelo's labs which include a BMP and a CBC and they are both unremarkable. I have also reviewed her electrocardiogram which is unrevealing. I believe Cailin Cortez would be an acceptable candidate for the planned procedure. She is hopeful that she will not have to stay overnight. We will see her for follow-up after her surgery.   She knows to stop her aspirin today 1 week ahead of surgery and to stop taking ibuprofen she only uses occasionally on a as needed basis.

## 2021-03-24 ENCOUNTER — TRANSCRIBE ORDER (OUTPATIENT)
Dept: REGISTRATION | Age: 61
End: 2021-03-24

## 2021-03-24 ENCOUNTER — HOSPITAL ENCOUNTER (OUTPATIENT)
Dept: PREADMISSION TESTING | Age: 61
Discharge: HOME | End: 2021-03-24
Payer: COMMERCIAL

## 2021-03-24 DIAGNOSIS — Z01.812 BLOOD TESTS PRIOR TO TREATMENT OR PROCEDURE: ICD-10-CM

## 2021-03-24 DIAGNOSIS — Z01.818 OTHER SPECIFIED PRE-OPERATIVE EXAMINATION: Primary | ICD-10-CM

## 2021-03-24 DIAGNOSIS — Z01.818 OTHER SPECIFIED PRE-OPERATIVE EXAMINATION: ICD-10-CM

## 2021-03-24 LAB
ABO + RH BLD: NORMAL
ANION GAP SERPL CALC-SCNC: 5 MMOL/L (ref 3–18)
BLOOD GROUP ANTIBODIES SERPL: NORMAL
BUN SERPL-MCNC: 24 MG/DL (ref 7–18)
BUN/CREAT SERPL: 22 (ref 12–20)
CALCIUM SERPL-MCNC: 9 MG/DL (ref 8.5–10.1)
CHLORIDE SERPL-SCNC: 110 MMOL/L (ref 100–111)
CO2 SERPL-SCNC: 26 MMOL/L (ref 21–32)
CREAT SERPL-MCNC: 1.09 MG/DL (ref 0.6–1.3)
ERYTHROCYTE [DISTWIDTH] IN BLOOD BY AUTOMATED COUNT: 14.5 % (ref 11.6–14.5)
GLUCOSE SERPL-MCNC: 106 MG/DL (ref 74–99)
HCT VFR BLD AUTO: 39.7 % (ref 35–45)
HGB BLD-MCNC: 12.8 G/DL (ref 12–16)
MCH RBC QN AUTO: 27.6 PG (ref 24–34)
MCHC RBC AUTO-ENTMCNC: 32.2 G/DL (ref 31–37)
MCV RBC AUTO: 85.6 FL (ref 74–97)
PLATELET # BLD AUTO: 157 K/UL (ref 135–420)
PMV BLD AUTO: 12.8 FL (ref 9.2–11.8)
POTASSIUM SERPL-SCNC: 4.4 MMOL/L (ref 3.5–5.5)
RBC # BLD AUTO: 4.64 M/UL (ref 4.2–5.3)
SODIUM SERPL-SCNC: 141 MMOL/L (ref 136–145)
SPECIMEN EXP DATE BLD: NORMAL
WBC # BLD AUTO: 5.4 K/UL (ref 4.6–13.2)

## 2021-03-24 PROCEDURE — 85027 COMPLETE CBC AUTOMATED: CPT

## 2021-03-24 PROCEDURE — 36415 COLL VENOUS BLD VENIPUNCTURE: CPT

## 2021-03-24 PROCEDURE — 80048 BASIC METABOLIC PNL TOTAL CA: CPT

## 2021-03-24 PROCEDURE — 86901 BLOOD TYPING SEROLOGIC RH(D): CPT

## 2021-03-24 PROCEDURE — U0003 INFECTIOUS AGENT DETECTION BY NUCLEIC ACID (DNA OR RNA); SEVERE ACUTE RESPIRATORY SYNDROME CORONAVIRUS 2 (SARS-COV-2) (CORONAVIRUS DISEASE [COVID-19]), AMPLIFIED PROBE TECHNIQUE, MAKING USE OF HIGH THROUGHPUT TECHNOLOGIES AS DESCRIBED BY CMS-2020-01-R: HCPCS

## 2021-03-25 LAB — SARS-COV-2, COV2NT: NOT DETECTED

## 2021-03-26 ENCOUNTER — ANESTHESIA EVENT (OUTPATIENT)
Dept: SURGERY | Age: 61
End: 2021-03-26
Payer: COMMERCIAL

## 2021-03-29 ENCOUNTER — ANESTHESIA (OUTPATIENT)
Dept: SURGERY | Age: 61
End: 2021-03-29
Payer: COMMERCIAL

## 2021-03-29 ENCOUNTER — HOSPITAL ENCOUNTER (OUTPATIENT)
Age: 61
Setting detail: OBSERVATION
Discharge: HOME HEALTH | End: 2021-03-30
Attending: ORTHOPAEDIC SURGERY | Admitting: ORTHOPAEDIC SURGERY
Payer: COMMERCIAL

## 2021-03-29 ENCOUNTER — APPOINTMENT (OUTPATIENT)
Dept: GENERAL RADIOLOGY | Age: 61
End: 2021-03-29
Attending: ORTHOPAEDIC SURGERY
Payer: COMMERCIAL

## 2021-03-29 DIAGNOSIS — Z98.890 STATUS POST LUMBAR LAMINECTOMY: Primary | ICD-10-CM

## 2021-03-29 PROCEDURE — 77030031878 HC NDL SPN ACC SYS I-PAS NUVA -D: Performed by: ORTHOPAEDIC SURGERY

## 2021-03-29 PROCEDURE — 74011250636 HC RX REV CODE- 250/636: Performed by: ORTHOPAEDIC SURGERY

## 2021-03-29 PROCEDURE — 74011250636 HC RX REV CODE- 250/636: Performed by: NURSE ANESTHETIST, CERTIFIED REGISTERED

## 2021-03-29 PROCEDURE — 77030027703 HC MAXCESS 4 KT DISP NUVA -H: Performed by: ORTHOPAEDIC SURGERY

## 2021-03-29 PROCEDURE — C1713 ANCHOR/SCREW BN/BN,TIS/BN: HCPCS | Performed by: ORTHOPAEDIC SURGERY

## 2021-03-29 PROCEDURE — 00630 ANES PX LUMBAR REGION NOS: CPT | Performed by: ANESTHESIOLOGY

## 2021-03-29 PROCEDURE — 99218 HC RM OBSERVATION: CPT

## 2021-03-29 PROCEDURE — 77030030163 HC BN WAX J&J -A: Performed by: ORTHOPAEDIC SURGERY

## 2021-03-29 PROCEDURE — 77030011265 HC ELECTRD BLD HEX COVD -A: Performed by: ORTHOPAEDIC SURGERY

## 2021-03-29 PROCEDURE — 74011000272 HC RX REV CODE- 272: Performed by: ORTHOPAEDIC SURGERY

## 2021-03-29 PROCEDURE — 77030030409 HC DIL SPN KT M5 DISP NUVA -G: Performed by: ORTHOPAEDIC SURGERY

## 2021-03-29 PROCEDURE — 77030040830 HC CATH URETH FOL MDII -A: Performed by: ORTHOPAEDIC SURGERY

## 2021-03-29 PROCEDURE — 77030031359 HC BLD BN MILL DISP STRY -E: Performed by: ORTHOPAEDIC SURGERY

## 2021-03-29 PROCEDURE — 2709999900 HC NON-CHARGEABLE SUPPLY: Performed by: ORTHOPAEDIC SURGERY

## 2021-03-29 PROCEDURE — 2709999900 HC NON-CHARGEABLE SUPPLY

## 2021-03-29 PROCEDURE — 77030013567 HC DRN WND RESERV BARD -A: Performed by: ORTHOPAEDIC SURGERY

## 2021-03-29 PROCEDURE — 74011250637 HC RX REV CODE- 250/637: Performed by: ORTHOPAEDIC SURGERY

## 2021-03-29 PROCEDURE — 77030004402 HC BUR NEUR STRY -C: Performed by: ORTHOPAEDIC SURGERY

## 2021-03-29 PROCEDURE — C1821 INTERSPINOUS IMPLANT: HCPCS | Performed by: ORTHOPAEDIC SURGERY

## 2021-03-29 PROCEDURE — 77030039267 HC ADH SKN EXOFIN S2SG -B: Performed by: ORTHOPAEDIC SURGERY

## 2021-03-29 PROCEDURE — 00630 ANES PX LUMBAR REGION NOS: CPT | Performed by: NURSE ANESTHETIST, CERTIFIED REGISTERED

## 2021-03-29 PROCEDURE — 77030019908 HC STETH ESOPH SIMS -A: Performed by: ANESTHESIOLOGY

## 2021-03-29 PROCEDURE — 74011000250 HC RX REV CODE- 250: Performed by: ORTHOPAEDIC SURGERY

## 2021-03-29 PROCEDURE — 77030034967 HC GRFT DBM PLS PST ALLOFUS 3CC ALLO- D: Performed by: ORTHOPAEDIC SURGERY

## 2021-03-29 PROCEDURE — 74011250637 HC RX REV CODE- 250/637: Performed by: NURSE ANESTHETIST, CERTIFIED REGISTERED

## 2021-03-29 PROCEDURE — 77030033138 HC SUT PGA STRATFX J&J -B: Performed by: ORTHOPAEDIC SURGERY

## 2021-03-29 PROCEDURE — 76210000016 HC OR PH I REC 1 TO 1.5 HR: Performed by: ORTHOPAEDIC SURGERY

## 2021-03-29 PROCEDURE — 77030013079 HC BLNKT BAIR HGGR 3M -A: Performed by: ANESTHESIOLOGY

## 2021-03-29 PROCEDURE — 76010000133 HC OR TIME 3 TO 3.5 HR: Performed by: ORTHOPAEDIC SURGERY

## 2021-03-29 PROCEDURE — 74011000250 HC RX REV CODE- 250: Performed by: NURSE ANESTHETIST, CERTIFIED REGISTERED

## 2021-03-29 PROCEDURE — 77030040356 HC CORD BPLR FRCP COVD -A: Performed by: ORTHOPAEDIC SURGERY

## 2021-03-29 PROCEDURE — 77030008683 HC TU ET CUF COVD -A: Performed by: ANESTHESIOLOGY

## 2021-03-29 PROCEDURE — 77030018390 HC SPNG HEMSTAT2 J&J -B: Performed by: ORTHOPAEDIC SURGERY

## 2021-03-29 PROCEDURE — 77030012406 HC DRN WND PENRS BARD -A: Performed by: ORTHOPAEDIC SURGERY

## 2021-03-29 PROCEDURE — 77030035236 HC SUT PDS STRATFX BARB J&J -B: Performed by: ORTHOPAEDIC SURGERY

## 2021-03-29 PROCEDURE — 77030003029 HC SUT VCRL J&J -B: Performed by: ORTHOPAEDIC SURGERY

## 2021-03-29 PROCEDURE — 77030014005 HC SPNG HEMSTAT GEL CARD -B: Performed by: ORTHOPAEDIC SURGERY

## 2021-03-29 PROCEDURE — 74011250637 HC RX REV CODE- 250/637: Performed by: ANESTHESIOLOGY

## 2021-03-29 PROCEDURE — 65270000029 HC RM PRIVATE

## 2021-03-29 PROCEDURE — 76060000037 HC ANESTHESIA 3 TO 3.5 HR: Performed by: ORTHOPAEDIC SURGERY

## 2021-03-29 DEVICE — SCREW SPNL L45MM OD6.5MM 2C REDUC RELINE: Type: IMPLANTABLE DEVICE | Site: SPINE LUMBAR | Status: FUNCTIONAL

## 2021-03-29 DEVICE — ROD SPNL L40MM DIA5.5MM POST THORACOLUMBOSACRAL TI LORD: Type: IMPLANTABLE DEVICE | Site: SPINE LUMBAR | Status: FUNCTIONAL

## 2021-03-29 DEVICE — RISE SPACER 12X26MM, 8-15MM, 10&DEG;
Type: IMPLANTABLE DEVICE | Site: SPINE LUMBAR | Status: FUNCTIONAL
Brand: RISE

## 2021-03-29 DEVICE — SCREW SPNL DIA5.5MM OPN TULIP LOK RELINE: Type: IMPLANTABLE DEVICE | Site: SPINE LUMBAR | Status: FUNCTIONAL

## 2021-03-29 DEVICE — SCREW SPNL L40MM OD7.5MM 2C REDUC RELINE: Type: IMPLANTABLE DEVICE | Site: SPINE LUMBAR | Status: FUNCTIONAL

## 2021-03-29 DEVICE — GRAFT BONE PASTE DEMINERLIZED BONE MTRX 3CC ALLOFUSE +: Type: IMPLANTABLE DEVICE | Site: SPINE LUMBAR | Status: FUNCTIONAL

## 2021-03-29 RX ORDER — LEVOTHYROXINE SODIUM 100 UG/1
100 TABLET ORAL DAILY
Status: DISCONTINUED | OUTPATIENT
Start: 2021-03-30 | End: 2021-03-30 | Stop reason: HOSPADM

## 2021-03-29 RX ORDER — DEXAMETHASONE SODIUM PHOSPHATE 4 MG/ML
INJECTION, SOLUTION INTRA-ARTICULAR; INTRALESIONAL; INTRAMUSCULAR; INTRAVENOUS; SOFT TISSUE AS NEEDED
Status: DISCONTINUED | OUTPATIENT
Start: 2021-03-29 | End: 2021-03-29 | Stop reason: HOSPADM

## 2021-03-29 RX ORDER — SODIUM CHLORIDE 0.9 % (FLUSH) 0.9 %
5-40 SYRINGE (ML) INJECTION EVERY 8 HOURS
Status: DISCONTINUED | OUTPATIENT
Start: 2021-03-29 | End: 2021-03-29

## 2021-03-29 RX ORDER — GLYCOPYRROLATE 0.2 MG/ML
INJECTION INTRAMUSCULAR; INTRAVENOUS AS NEEDED
Status: DISCONTINUED | OUTPATIENT
Start: 2021-03-29 | End: 2021-03-29 | Stop reason: HOSPADM

## 2021-03-29 RX ORDER — HYDROMORPHONE HYDROCHLORIDE 2 MG/ML
0.5 INJECTION, SOLUTION INTRAMUSCULAR; INTRAVENOUS; SUBCUTANEOUS ONCE
Status: DISCONTINUED | OUTPATIENT
Start: 2021-03-29 | End: 2021-03-29 | Stop reason: HOSPADM

## 2021-03-29 RX ORDER — FAMOTIDINE 20 MG/1
20 TABLET, FILM COATED ORAL ONCE
Status: COMPLETED | OUTPATIENT
Start: 2021-03-29 | End: 2021-03-29

## 2021-03-29 RX ORDER — DEXTROSE 50 % IN WATER (D50W) INTRAVENOUS SYRINGE
25-50 AS NEEDED
Status: DISCONTINUED | OUTPATIENT
Start: 2021-03-29 | End: 2021-03-29 | Stop reason: HOSPADM

## 2021-03-29 RX ORDER — SODIUM CHLORIDE 0.9 % (FLUSH) 0.9 %
5-40 SYRINGE (ML) INJECTION AS NEEDED
Status: DISCONTINUED | OUTPATIENT
Start: 2021-03-29 | End: 2021-03-30 | Stop reason: HOSPADM

## 2021-03-29 RX ORDER — PANTOPRAZOLE SODIUM 40 MG/1
40 TABLET, DELAYED RELEASE ORAL
Status: DISCONTINUED | OUTPATIENT
Start: 2021-03-29 | End: 2021-03-30 | Stop reason: HOSPADM

## 2021-03-29 RX ORDER — HYDROMORPHONE HYDROCHLORIDE 1 MG/ML
1 INJECTION, SOLUTION INTRAMUSCULAR; INTRAVENOUS; SUBCUTANEOUS
Status: DISCONTINUED | OUTPATIENT
Start: 2021-03-29 | End: 2021-03-30 | Stop reason: HOSPADM

## 2021-03-29 RX ORDER — KETAMINE HCL 50MG/ML(1)
SYRINGE (ML) INTRAVENOUS AS NEEDED
Status: DISCONTINUED | OUTPATIENT
Start: 2021-03-29 | End: 2021-03-29 | Stop reason: HOSPADM

## 2021-03-29 RX ORDER — ONDANSETRON 2 MG/ML
INJECTION INTRAMUSCULAR; INTRAVENOUS AS NEEDED
Status: DISCONTINUED | OUTPATIENT
Start: 2021-03-29 | End: 2021-03-29 | Stop reason: HOSPADM

## 2021-03-29 RX ORDER — SODIUM CHLORIDE 0.9 % (FLUSH) 0.9 %
5-40 SYRINGE (ML) INJECTION EVERY 8 HOURS
Status: DISCONTINUED | OUTPATIENT
Start: 2021-03-29 | End: 2021-03-30 | Stop reason: HOSPADM

## 2021-03-29 RX ORDER — ONDANSETRON 2 MG/ML
4 INJECTION INTRAMUSCULAR; INTRAVENOUS ONCE
Status: COMPLETED | OUTPATIENT
Start: 2021-03-29 | End: 2021-03-29

## 2021-03-29 RX ORDER — SODIUM CHLORIDE 0.9 % (FLUSH) 0.9 %
5-40 SYRINGE (ML) INJECTION AS NEEDED
Status: DISCONTINUED | OUTPATIENT
Start: 2021-03-29 | End: 2021-03-29

## 2021-03-29 RX ORDER — PROPOFOL 10 MG/ML
INJECTION, EMULSION INTRAVENOUS AS NEEDED
Status: DISCONTINUED | OUTPATIENT
Start: 2021-03-29 | End: 2021-03-29 | Stop reason: HOSPADM

## 2021-03-29 RX ORDER — DIAZEPAM 5 MG/1
5 TABLET ORAL
Status: DISCONTINUED | OUTPATIENT
Start: 2021-03-29 | End: 2021-03-30 | Stop reason: HOSPADM

## 2021-03-29 RX ORDER — CEFAZOLIN SODIUM 2 G/50ML
2 SOLUTION INTRAVENOUS ONCE
Status: COMPLETED | OUTPATIENT
Start: 2021-03-29 | End: 2021-03-29

## 2021-03-29 RX ORDER — BUPIVACAINE HYDROCHLORIDE 5 MG/ML
INJECTION, SOLUTION EPIDURAL; INTRACAUDAL AS NEEDED
Status: DISCONTINUED | OUTPATIENT
Start: 2021-03-29 | End: 2021-03-29 | Stop reason: HOSPADM

## 2021-03-29 RX ORDER — FENTANYL CITRATE 50 UG/ML
INJECTION, SOLUTION INTRAMUSCULAR; INTRAVENOUS AS NEEDED
Status: DISCONTINUED | OUTPATIENT
Start: 2021-03-29 | End: 2021-03-29 | Stop reason: HOSPADM

## 2021-03-29 RX ORDER — LORAZEPAM 2 MG/ML
1 INJECTION INTRAMUSCULAR
Status: DISCONTINUED | OUTPATIENT
Start: 2021-03-29 | End: 2021-03-30 | Stop reason: HOSPADM

## 2021-03-29 RX ORDER — SODIUM CHLORIDE, SODIUM LACTATE, POTASSIUM CHLORIDE, CALCIUM CHLORIDE 600; 310; 30; 20 MG/100ML; MG/100ML; MG/100ML; MG/100ML
25 INJECTION, SOLUTION INTRAVENOUS CONTINUOUS
Status: DISCONTINUED | OUTPATIENT
Start: 2021-03-29 | End: 2021-03-29 | Stop reason: HOSPADM

## 2021-03-29 RX ORDER — FENTANYL CITRATE 50 UG/ML
50 INJECTION, SOLUTION INTRAMUSCULAR; INTRAVENOUS AS NEEDED
Status: DISCONTINUED | OUTPATIENT
Start: 2021-03-29 | End: 2021-03-29

## 2021-03-29 RX ORDER — ACETAMINOPHEN 500 MG
1000 TABLET ORAL EVERY 6 HOURS
Status: DISCONTINUED | OUTPATIENT
Start: 2021-03-29 | End: 2021-03-30 | Stop reason: HOSPADM

## 2021-03-29 RX ORDER — SCOLOPAMINE TRANSDERMAL SYSTEM 1 MG/1
1 PATCH, EXTENDED RELEASE TRANSDERMAL
Status: DISCONTINUED | OUTPATIENT
Start: 2021-03-29 | End: 2021-03-29

## 2021-03-29 RX ORDER — LIDOCAINE HYDROCHLORIDE 20 MG/ML
INJECTION, SOLUTION EPIDURAL; INFILTRATION; INTRACAUDAL; PERINEURAL AS NEEDED
Status: DISCONTINUED | OUTPATIENT
Start: 2021-03-29 | End: 2021-03-29 | Stop reason: HOSPADM

## 2021-03-29 RX ORDER — ONDANSETRON 2 MG/ML
4 INJECTION INTRAMUSCULAR; INTRAVENOUS
Status: DISCONTINUED | OUTPATIENT
Start: 2021-03-29 | End: 2021-03-30 | Stop reason: HOSPADM

## 2021-03-29 RX ORDER — OXYCODONE HYDROCHLORIDE 5 MG/1
5-10 TABLET ORAL
Status: DISCONTINUED | OUTPATIENT
Start: 2021-03-29 | End: 2021-03-30 | Stop reason: HOSPADM

## 2021-03-29 RX ORDER — CEFAZOLIN SODIUM 2 G/50ML
2 SOLUTION INTRAVENOUS EVERY 8 HOURS
Status: DISCONTINUED | OUTPATIENT
Start: 2021-03-29 | End: 2021-03-30 | Stop reason: HOSPADM

## 2021-03-29 RX ORDER — INSULIN LISPRO 100 [IU]/ML
INJECTION, SOLUTION INTRAVENOUS; SUBCUTANEOUS ONCE
Status: DISCONTINUED | OUTPATIENT
Start: 2021-03-29 | End: 2021-03-29 | Stop reason: HOSPADM

## 2021-03-29 RX ORDER — SUCCINYLCHOLINE CHLORIDE 20 MG/ML
INJECTION INTRAMUSCULAR; INTRAVENOUS AS NEEDED
Status: DISCONTINUED | OUTPATIENT
Start: 2021-03-29 | End: 2021-03-29 | Stop reason: HOSPADM

## 2021-03-29 RX ORDER — NALOXONE HYDROCHLORIDE 0.4 MG/ML
0.4 INJECTION, SOLUTION INTRAMUSCULAR; INTRAVENOUS; SUBCUTANEOUS AS NEEDED
Status: DISCONTINUED | OUTPATIENT
Start: 2021-03-29 | End: 2021-03-30 | Stop reason: HOSPADM

## 2021-03-29 RX ORDER — HYDROMORPHONE HYDROCHLORIDE 2 MG/ML
0.5 INJECTION, SOLUTION INTRAMUSCULAR; INTRAVENOUS; SUBCUTANEOUS
Status: DISCONTINUED | OUTPATIENT
Start: 2021-03-29 | End: 2021-03-29

## 2021-03-29 RX ORDER — MAGNESIUM SULFATE 100 %
4 CRYSTALS MISCELLANEOUS AS NEEDED
Status: DISCONTINUED | OUTPATIENT
Start: 2021-03-29 | End: 2021-03-29 | Stop reason: HOSPADM

## 2021-03-29 RX ORDER — VANCOMYCIN HYDROCHLORIDE 1 G/20ML
INJECTION, POWDER, LYOPHILIZED, FOR SOLUTION INTRAVENOUS AS NEEDED
Status: DISCONTINUED | OUTPATIENT
Start: 2021-03-29 | End: 2021-03-29 | Stop reason: HOSPADM

## 2021-03-29 RX ORDER — DIPHENHYDRAMINE HYDROCHLORIDE 50 MG/ML
12.5 INJECTION, SOLUTION INTRAMUSCULAR; INTRAVENOUS
Status: DISCONTINUED | OUTPATIENT
Start: 2021-03-29 | End: 2021-03-30 | Stop reason: HOSPADM

## 2021-03-29 RX ORDER — DEXMEDETOMIDINE HYDROCHLORIDE 4 UG/ML
INJECTION, SOLUTION INTRAVENOUS AS NEEDED
Status: DISCONTINUED | OUTPATIENT
Start: 2021-03-29 | End: 2021-03-29 | Stop reason: HOSPADM

## 2021-03-29 RX ADMIN — ONDANSETRON 4 MG: 2 INJECTION INTRAMUSCULAR; INTRAVENOUS at 13:16

## 2021-03-29 RX ADMIN — PROPOFOL 80 MG: 10 INJECTION, EMULSION INTRAVENOUS at 13:16

## 2021-03-29 RX ADMIN — DEXMEDETOMIDINE HYDROCHLORIDE 10 MCG: 100 INJECTION, SOLUTION, CONCENTRATE INTRAVENOUS at 13:50

## 2021-03-29 RX ADMIN — GLYCOPYRROLATE 0.2 MG: 0.2 INJECTION INTRAMUSCULAR; INTRAVENOUS at 13:41

## 2021-03-29 RX ADMIN — DEXMEDETOMIDINE HYDROCHLORIDE 8 MCG: 100 INJECTION, SOLUTION, CONCENTRATE INTRAVENOUS at 13:51

## 2021-03-29 RX ADMIN — DEXMEDETOMIDINE HYDROCHLORIDE 6 MCG: 100 INJECTION, SOLUTION, CONCENTRATE INTRAVENOUS at 14:15

## 2021-03-29 RX ADMIN — Medication 10 ML: at 22:00

## 2021-03-29 RX ADMIN — DEXMEDETOMIDINE HYDROCHLORIDE 10 MCG: 100 INJECTION, SOLUTION, CONCENTRATE INTRAVENOUS at 13:02

## 2021-03-29 RX ADMIN — HYDROMORPHONE HYDROCHLORIDE 0.5 MG: 2 INJECTION INTRAMUSCULAR; INTRAVENOUS; SUBCUTANEOUS at 17:00

## 2021-03-29 RX ADMIN — SUCCINYLCHOLINE CHLORIDE 160 MG: 20 INJECTION, SOLUTION INTRAMUSCULAR; INTRAVENOUS at 13:16

## 2021-03-29 RX ADMIN — HYDROMORPHONE HYDROCHLORIDE 0.5 MG: 2 INJECTION INTRAMUSCULAR; INTRAVENOUS; SUBCUTANEOUS at 17:11

## 2021-03-29 RX ADMIN — DEXMEDETOMIDINE HYDROCHLORIDE 8 MCG: 100 INJECTION, SOLUTION, CONCENTRATE INTRAVENOUS at 13:22

## 2021-03-29 RX ADMIN — DEXMEDETOMIDINE HYDROCHLORIDE 6 MCG: 100 INJECTION, SOLUTION, CONCENTRATE INTRAVENOUS at 14:21

## 2021-03-29 RX ADMIN — DEXMEDETOMIDINE HYDROCHLORIDE 6 MCG: 100 INJECTION, SOLUTION, CONCENTRATE INTRAVENOUS at 15:09

## 2021-03-29 RX ADMIN — LIDOCAINE HYDROCHLORIDE 100 MG: 20 INJECTION, SOLUTION EPIDURAL; INFILTRATION; INTRACAUDAL; PERINEURAL at 13:16

## 2021-03-29 RX ADMIN — FENTANYL CITRATE 50 MCG: 50 INJECTION, SOLUTION INTRAMUSCULAR; INTRAVENOUS at 15:23

## 2021-03-29 RX ADMIN — ACETAMINOPHEN 1000 MG: 500 TABLET, FILM COATED ORAL at 18:08

## 2021-03-29 RX ADMIN — DEXMEDETOMIDINE HYDROCHLORIDE 6 MCG: 100 INJECTION, SOLUTION, CONCENTRATE INTRAVENOUS at 14:18

## 2021-03-29 RX ADMIN — Medication 10 ML: at 18:09

## 2021-03-29 RX ADMIN — DEXMEDETOMIDINE HYDROCHLORIDE 6 MCG: 100 INJECTION, SOLUTION, CONCENTRATE INTRAVENOUS at 15:03

## 2021-03-29 RX ADMIN — Medication 50 MG: at 13:16

## 2021-03-29 RX ADMIN — CEFAZOLIN SODIUM 2 G: 2 SOLUTION INTRAVENOUS at 21:00

## 2021-03-29 RX ADMIN — DEXMEDETOMIDINE HYDROCHLORIDE 14 MCG: 100 INJECTION, SOLUTION, CONCENTRATE INTRAVENOUS at 14:56

## 2021-03-29 RX ADMIN — CEFAZOLIN SODIUM 2 G: 2 SOLUTION INTRAVENOUS at 13:23

## 2021-03-29 RX ADMIN — DEXMEDETOMIDINE HYDROCHLORIDE 8 MCG: 100 INJECTION, SOLUTION, CONCENTRATE INTRAVENOUS at 15:07

## 2021-03-29 RX ADMIN — GLYCOPYRROLATE 0.2 MG: 0.2 INJECTION INTRAMUSCULAR; INTRAVENOUS at 14:57

## 2021-03-29 RX ADMIN — DEXMEDETOMIDINE HYDROCHLORIDE 10 MCG: 100 INJECTION, SOLUTION, CONCENTRATE INTRAVENOUS at 13:16

## 2021-03-29 RX ADMIN — DEXMEDETOMIDINE HYDROCHLORIDE 6 MCG: 100 INJECTION, SOLUTION, CONCENTRATE INTRAVENOUS at 13:36

## 2021-03-29 RX ADMIN — HYDROMORPHONE HYDROCHLORIDE 0.5 MG: 2 INJECTION INTRAMUSCULAR; INTRAVENOUS; SUBCUTANEOUS at 17:26

## 2021-03-29 RX ADMIN — PROPOFOL 50 MG: 10 INJECTION, EMULSION INTRAVENOUS at 13:49

## 2021-03-29 RX ADMIN — DEXAMETHASONE SODIUM PHOSPHATE 8 MG: 4 INJECTION, SOLUTION INTRAMUSCULAR; INTRAVENOUS at 13:16

## 2021-03-29 RX ADMIN — SODIUM CHLORIDE, SODIUM LACTATE, POTASSIUM CHLORIDE, AND CALCIUM CHLORIDE 25 ML/HR: 600; 310; 30; 20 INJECTION, SOLUTION INTRAVENOUS at 08:36

## 2021-03-29 RX ADMIN — DEXMEDETOMIDINE HYDROCHLORIDE 8 MCG: 100 INJECTION, SOLUTION, CONCENTRATE INTRAVENOUS at 15:30

## 2021-03-29 RX ADMIN — PANTOPRAZOLE 40 MG: 40 TABLET, DELAYED RELEASE ORAL at 18:08

## 2021-03-29 RX ADMIN — FAMOTIDINE 20 MG: 20 TABLET ORAL at 08:36

## 2021-03-29 RX ADMIN — DEXMEDETOMIDINE HYDROCHLORIDE 10 MCG: 100 INJECTION, SOLUTION, CONCENTRATE INTRAVENOUS at 13:46

## 2021-03-29 RX ADMIN — DEXMEDETOMIDINE HYDROCHLORIDE 6 MCG: 100 INJECTION, SOLUTION, CONCENTRATE INTRAVENOUS at 13:27

## 2021-03-29 RX ADMIN — ONDANSETRON 4 MG: 2 INJECTION INTRAMUSCULAR; INTRAVENOUS at 16:57

## 2021-03-29 NOTE — PERIOP NOTES
Pre-Op Summary    Pt arrived via car with family/friend and is oriented to time, place, person and situation. Patient with steady gait with walker assistive devices. Visit Vitals  /83 (BP 1 Location: Left upper arm, BP Patient Position: At rest)   Pulse 78   Temp 97.4 °F (36.3 °C)   Resp 18   Ht 5' 4\" (1.626 m)   Wt 118.4 kg (261 lb)   SpO2 97%   BMI 44.80 kg/m²       Peripheral IV located on Right hand . Patients belongings are located [facility]. Patient's point of contact is Kingsley Simms (sister) and their contact number is: [phone]. They will be leaving and coming back. They are able to receive medication information. They will be their ride home.     [id number]  [id number]

## 2021-03-29 NOTE — PERIOP NOTES
TRANSFER - OUT REPORT:    Telephone report given to [facility] (name) on Prakash Yang  being transferred to 2209(unit) for routine post - op       Report consisted of patients Situation, Background, Assessment and   Recommendations(SBAR). Information from the following report(s) SBAR, OR Summary and MAR was reviewed with the receiving nurse. Lines:   Peripheral IV 03/29/21 Right Hand (Active)   Site Assessment Clean, dry, & intact 03/29/21 1623   Phlebitis Assessment 0 03/29/21 1623   Infiltration Assessment 0 03/29/21 1623   Dressing Status Clean, dry, & intact 03/29/21 1623   Dressing Type Tape;Transparent 03/29/21 1623   Hub Color/Line Status Pink; Infusing 03/29/21 1623   Alcohol Cap Used No 03/29/21 0824        Opportunity for questions and clarification was provided.       Patient transported with:   O2 @ 3 liters  Tech

## 2021-03-29 NOTE — PROGRESS NOTES
Problem: Falls - Risk of  Goal: *Absence of Falls  Description: Document Axel Mccabe Fall Risk and appropriate interventions in the flowsheet.   Outcome: Progressing Towards Goal  Note: Fall Risk Interventions:  Mobility Interventions: Communicate number of staff needed for ambulation/transfer, Patient to call before getting OOB, Utilize walker, cane, or other assistive device         Medication Interventions: Evaluate medications/consider consulting pharmacy, Patient to call before getting OOB, Teach patient to arise slowly    Elimination Interventions: Call light in reach, Patient to call for help with toileting needs, Toileting schedule/hourly rounds              Problem: Patient Education: Go to Patient Education Activity  Goal: Patient/Family Education  Outcome: Progressing Towards Goal     Problem: Infection - Risk of, Surgical Site Infection  Goal: *Absence of surgical site infection signs and symptoms  Outcome: Progressing Towards Goal     Problem: Patient Education: Go to Patient Education Activity  Goal: Patient/Family Education  Outcome: Progressing Towards Goal     Problem: Pain  Goal: *Control of Pain  Outcome: Progressing Towards Goal     Problem: Patient Education: Go to Patient Education Activity  Goal: Patient/Family Education  Outcome: Progressing Towards Goal

## 2021-03-29 NOTE — ROUTINE PROCESS
TRANSFER - IN REPORT: 
 
Verbal report received from MICHAEL Navarro(name) on Dub Ira  being received from [facility]) for routine post - op Report consisted of patients Situation, Background, Assessment and  
Recommendations(SBAR). Information from the following report(s) SBAR, Kardex, Procedure Summary, Intake/Output, MAR, Recent Results and Med Rec Status was reviewed with the receiving nurse. Opportunity for questions and clarification was provided. Assessment completed upon patients arrival to unit and care assumed.

## 2021-03-29 NOTE — INTERVAL H&P NOTE
Update History & Physical 
 
The Patient's History and Physical of March 25, 2021 was reviewed with the patient and I examined the patient. There was no change. The surgical site was confirmed by the patient and me. Plan:  The risk, benefits, expected outcome, and alternative to the recommended procedure have been discussed with the patient. Patient understands and wants to proceed with the procedure.  
 
Electronically signed by Ashvin Pastrana MD on 3/29/2021 at 10:11 AM

## 2021-03-29 NOTE — ROUTINE PROCESS
Bedside shift change report given to MICHAEL Moncada (oncoming nurse) by MICHAEL Nolasco (offgoing nurse). Report included the following information SBAR, Kardex, Procedure Summary, Intake/Output, MAR, Recent Results and Med Rec Status.

## 2021-03-29 NOTE — INTERVAL H&P NOTE
Update History & Physical 
 
The Patient's History and Physical of March 25, 2021 was reviewed with the patient and I examined the patient. There was no change. The surgical site was confirmed by the patient and me. Plan:  The risk, benefits, expected outcome, and alternative to the recommended procedure have been discussed with the patient. Patient understands and wants to proceed with the procedure.  
 
Electronically signed by Lalo Machado MD on 3/29/2021 at 9:54 AM

## 2021-03-29 NOTE — BRIEF OP NOTE
Brief Postoperative Note    Patient: Johnathan Pena  YOB: 1960  MRN: [id number]    Date of Procedure: 3/29/2021     Pre-Op Diagnosis: M43.16, M48.062 SPONDYLOLISTHESIS, SPINAL STENOSIS, CAUDA EQUINA SYNDROME    Post-Op Diagnosis: Same as preoperative diagnosis. Procedure(s):  L4/5 LAMINECTOMY FUSION/TRANSFORAMINAL LUMBAR INTERBODY FUSION (TLIF)/C-ARM/NUVASIVE/GLOBUS    Surgeon(s):  Kiel Guidry MD    Surgical Assistant: Surg Asst-1: Keagan Barrera    Anesthesia: General     Estimated Blood Loss (mL): Minimal    Complications: None    Specimens: * No specimens in log *     Implants:   Implant Name Type Inv. Item Serial No.  Lot No. LRB No. Used Action   SPACER SPNL [id number] 10DEG THORACOLUM TI LORDTC SELF - [id number]  SPACER SPNL [id number] 10DEG THORACOLUM TI LORDTC SELF  [employer]_WD N/A N/A 1 Implanted   SCREW SPNL DIA5. 5MM OPN [place] MARY RELINE - [id number]  SCREW SPNL DIA5. 5MM OPN TULIP MARY RELINE  NUVASIVE INC_WD N/A N/A 4 Implanted   SCREW SPNL L40MM OD7.5MM 2C REDUC RELINE - [id number]  SCREW SPNL L40MM OD7.5MM 2C REDUC RELINE  [employer] INC_WD N/A N/A 2 Implanted   SCREW SPNL L45MM OD6.5MM 2C REDUC RELINE - [id number]  SCREW SPNL L45MM OD6.5MM 2C REDUC RELINE  [employer] INC_WD N/A N/A 2 Implanted   GRAFT BONE PASTE DEMINERLIZED BONE MTRX 3CC [employer]USE + - [id number]  GRAFT BONE PASTE DEMINERLIZED BONE MTRX 3CC ALLOFUSE + [id number] [employer]_WD  N/A 1 Implanted   KAREN SPNL L40MM DIA5. 5MM POST THORACOLUMBOSACRAL TI LORD - [id number]  KAREN SPNL L40MM DIA5. 5MM POST THORACOLUMBOSACRAL TI LORD  [employer] INC_WD N/A N/A 2 Implanted     Stenosis, instability  Drains: * No LDAs found *    Findings: fair bone    Electronically Signed by Shellie Hook MD on 3/29/2021 at 3:43 PM

## 2021-03-29 NOTE — ANESTHESIA PREPROCEDURE EVALUATION
Relevant Problems   No relevant active problems       Anesthetic History     PONV          Review of Systems / Medical History  Patient summary reviewed and pertinent labs reviewed    Pulmonary          Shortness of breath      Comments: Ex smoker   Neuro/Psych             Comments: Chronic pain  Neuropathy Cardiovascular    Hypertension              Exercise tolerance: <4 METS     GI/Hepatic/Renal     GERD: well controlled           Endo/Other      Hypothyroidism  Morbid obesity     Other Findings              Physical Exam    Airway  Mallampati: III  TM Distance: > 6 cm  Neck ROM: normal range of motion   Mouth opening: Normal     Cardiovascular  Regular rate and rhythm,  S1 and S2 normal,  no murmur, click, rub, or gallop             Dental    Dentition: Full lower dentures and Full upper dentures     Pulmonary  Breath sounds clear to auscultation               Abdominal  GI exam deferred       Other Findings            Anesthetic Plan    ASA: 3  Anesthesia type: general      Post-op pain plan if not by surgeon: IV PCA    Induction: Intravenous  Anesthetic plan and risks discussed with: Patient

## 2021-03-30 VITALS
DIASTOLIC BLOOD PRESSURE: 63 MMHG | OXYGEN SATURATION: 97 % | HEART RATE: 61 BPM | SYSTOLIC BLOOD PRESSURE: 115 MMHG | WEIGHT: 261 LBS | HEIGHT: 64 IN | BODY MASS INDEX: 44.56 KG/M2 | TEMPERATURE: 98.2 F | RESPIRATION RATE: 19 BRPM

## 2021-03-30 LAB
ERYTHROCYTE [DISTWIDTH] IN BLOOD BY AUTOMATED COUNT: 14.7 % (ref 11.6–14.5)
HCT VFR BLD AUTO: 37.8 % (ref 35–45)
HGB BLD-MCNC: 12 G/DL (ref 12–16)
MCH RBC QN AUTO: 27.6 PG (ref 24–34)
MCHC RBC AUTO-ENTMCNC: 31.7 G/DL (ref 31–37)
MCV RBC AUTO: 86.9 FL (ref 74–97)
PLATELET # BLD AUTO: 171 K/UL (ref 135–420)
PMV BLD AUTO: 13.4 FL (ref 9.2–11.8)
RBC # BLD AUTO: 4.35 M/UL (ref 4.2–5.3)
WBC # BLD AUTO: 10.1 K/UL (ref 4.6–13.2)

## 2021-03-30 PROCEDURE — 99218 HC RM OBSERVATION: CPT

## 2021-03-30 PROCEDURE — 97161 PT EVAL LOW COMPLEX 20 MIN: CPT

## 2021-03-30 PROCEDURE — 97165 OT EVAL LOW COMPLEX 30 MIN: CPT

## 2021-03-30 PROCEDURE — 36415 COLL VENOUS BLD VENIPUNCTURE: CPT

## 2021-03-30 PROCEDURE — 74011250636 HC RX REV CODE- 250/636: Performed by: ORTHOPAEDIC SURGERY

## 2021-03-30 PROCEDURE — 97535 SELF CARE MNGMENT TRAINING: CPT

## 2021-03-30 PROCEDURE — 74011250637 HC RX REV CODE- 250/637: Performed by: ORTHOPAEDIC SURGERY

## 2021-03-30 PROCEDURE — 85027 COMPLETE CBC AUTOMATED: CPT

## 2021-03-30 RX ORDER — OXYCODONE HYDROCHLORIDE 5 MG/1
5 TABLET ORAL
Qty: 28 TAB | Refills: 0 | Status: SHIPPED | OUTPATIENT
Start: 2021-03-30 | End: 2021-04-06

## 2021-03-30 RX ADMIN — LEVOTHYROXINE SODIUM 100 MCG: 0.1 TABLET ORAL at 08:41

## 2021-03-30 RX ADMIN — OXYCODONE HYDROCHLORIDE 10 MG: 5 TABLET ORAL at 08:45

## 2021-03-30 RX ADMIN — ACETAMINOPHEN 1000 MG: 500 TABLET, FILM COATED ORAL at 00:00

## 2021-03-30 RX ADMIN — Medication 10 ML: at 06:09

## 2021-03-30 RX ADMIN — CEFAZOLIN SODIUM 2 G: 2 SOLUTION INTRAVENOUS at 06:08

## 2021-03-30 RX ADMIN — ACETAMINOPHEN 1000 MG: 500 TABLET, FILM COATED ORAL at 06:08

## 2021-03-30 RX ADMIN — ACETAMINOPHEN 1000 MG: 500 TABLET, FILM COATED ORAL at 11:50

## 2021-03-30 RX ADMIN — PANTOPRAZOLE 40 MG: 40 TABLET, DELAYED RELEASE ORAL at 07:30

## 2021-03-30 NOTE — ACP (ADVANCE CARE PLANNING)
Advance Care Planning     General Advance Care Planning (ACP) Conversation      Date of Conversation: 3/8/2021  Conducted with: Patient with Decision Making Capacity    Healthcare Decision Maker:     Primary Decision Maker: Elena Boateng surgery update and  - [facility] - [phone]  Click here to complete [employer] including selection of the Healthcare Decision Maker Relationship (ie \"Primary\")  Today we documented Decision Makers consistant with patient preferences    Content/Action Overview:    Has ACP document(s) on file - reflects the patient's care preferences    Jasmina Mcfarland RN - Outcomes Manager  [phone]

## 2021-03-30 NOTE — PROGRESS NOTES
Problem: Falls - Risk of  Goal: *Absence of Falls  Description: Document Blair Ramirez Fall Risk and appropriate interventions in the flowsheet.   Outcome: Progressing Towards Goal  Note: Fall Risk Interventions:  Mobility Interventions: Communicate number of staff needed for ambulation/transfer         Medication Interventions: Evaluate medications/consider consulting pharmacy    Elimination Interventions: Call light in reach              Problem: Patient Education: Go to Patient Education Activity  Goal: Patient/Family Education  Outcome: Progressing Towards Goal     Problem: Infection - Risk of, Surgical Site Infection  Goal: *Absence of surgical site infection signs and symptoms  Outcome: Progressing Towards Goal     Problem: Patient Education: Go to Patient Education Activity  Goal: Patient/Family Education  Outcome: Progressing Towards Goal     Problem: Pain  Goal: *Control of Pain  Outcome: Progressing Towards Goal     Problem: Patient Education: Go to Patient Education Activity  Goal: Patient/Family Education  Outcome: Progressing Towards Goal

## 2021-03-30 NOTE — DISCHARGE SUMMARY
Discharge/Transfer  Summary     Patient: Tiff Lizarraga MRN: [id number]  SSN: [id number]    YOB: 1960  Age: 56 y.o. Sex: female       Admit Date: 3/29/2021    Discharge Date: 3/30/2021      Admission Diagnoses: Status post lumbar laminectomy [Z98.890]    Discharge Diagnoses:   Problem List as of 3/30/2021 Date Reviewed: 3/22/2021          Codes Class Noted - Resolved    Status post lumbar laminectomy ICD-10-CM: Z98.890  ICD-9-CM: V45.89  3/29/2021 - Present        GERD (gastroesophageal reflux disease) ICD-10-CM: K21.9  ICD-9-CM: 530.81  Unknown - Present        Hypothyroidism ICD-10-CM: E03.9  ICD-9-CM: 244.9  Unknown - Present        Back pain ICD-10-CM: M54.9  ICD-9-CM: 724.5  11/20/2020 - Present        Essential hypertension ICD-10-CM: I10  ICD-9-CM: 401.9  11/20/2020 - Present               Discharge Condition: Good    Hospital Course: benign      Disposition: home    Discharge Medications:   Current Discharge Medication List      START taking these medications    Details   oxyCODONE IR (Roxicodone) 5 mg immediate release tablet Take 1 Tab by mouth every six (6) hours as needed for Pain for up to 7 days. Max Daily Amount: 20 mg.  Qty: 28 Tab, Refills: 0    Associated Diagnoses: Status post lumbar laminectomy         CONTINUE these medications which have NOT CHANGED    Details   aspirin (ASPIRIN) 325 mg tablet Take 325 mg by mouth daily. levothyroxine (SYNTHROID) 100 mcg tablet Take 1 Tab by mouth daily. pantoprazole (PROTONIX) 40 mg tablet Take 1 Tab by mouth daily. STOP taking these medications       ibuprofen (MOTRIN) 200 mg tablet Comments:   Reason for Stopping: Follow-up Appointments   Procedures    FOLLOW UP VISIT Appointment in: Two Weeks     Standing Status:   Standing     Number of Occurrences:   1     Order Specific Question:   Appointment in     Answer:    Two Weeks       Signed By: Sammy Johnson MD     March 30, 2021

## 2021-03-30 NOTE — PROGRESS NOTES
1930    Bedside, Verbal, and Written shift change report given to [address] [place] [address] (oncoming nurse) by Db Larsen RN (offgoing nurse). Report included the following information SBAR, Kardex, and MAR.     2000  Patient is in bed, alert and oriented. Oxygen on for comfort, IV CDI, dressing CDI, no sign of distress. SCD on. Neuro check done. Patient repositioned. Bed low, call bell within reach. 0400  Patient patient was able to ambulate from bed to recliner and back. Used a walker, WBAT. D/c merlos as ordered. 0500  Patient offered assistance with hygiene. Changed gown and underpad. 0600 Trash emptied, given water. Patient repositioned. No sign of distress. 0700  Patient is in bed resting/ sleeping. Patient has no complaint. 0730  Bedside, Verbal, and Written shift change report given to [address] [place] [address] (oncoming nurse) by [address] [place] [address] (offgoing nurse). Report included the following information SBAR, Kardex, and MAR.

## 2021-03-30 NOTE — PROGRESS NOTES
Reason for Admission:  Status post lumbar laminectomy [Z98.890]                 RUR Score:    4%            Plan for utilizing home health:    Yes, freedom of choice signed for agencies in [facility]                      Likelihood of Readmission:   LOW                         Transition of Care Plan:              Initial assessment completed with patient. Cognitive status of patient: oriented to time, place, person and situation. Face sheet information confirmed:  yes. The patient designates sister, Hemal Islas to participate in her discharge plan and to receive any needed information. This patient lives in a single family home alone but her sister will be staying with her for a while. .  Patient is able to navigate steps as needed. Prior to hospitalization, patient was considered to be independent with ADLs/IADLS : yes . Patient has a current ACP document on file: no, completed at bedside. Healthcare Decision Maker:   Primary Decision Maker: [facility] surgery update and  - Sister - [phone]    Click here to complete [address] including selection of the Healthcare Decision Maker Relationship (ie \"Primary\")    The sister will be available to transport patient home upon discharge. The patient already has Cha Poser, and wheelchair available in the home. Patient is not currently active with home health. Patient has not stayed in a skilled nursing facility or rehab. This patient is on dialysis :no    List of available Home Health agencies were provided and reviewed with the patient prior to discharge. Freedom of choice signed: yes, for agencies in the [facility]. Currently, the discharge plan is Home with [address] [place] [address] Gelacio Danielson. The patient states that she can obtain her medications from the pharmacy, and take her medications as directed. Patient's current insurance is [employer]. Care Management Interventions  PCP Verified by CM:  Yes  Mode of Transport at Discharge: Self  Transition of Care Consult (CM Consult): Discharge Planning, [address]: No  Reason Outside [place]: Out of service area  Current Support Network: Lives Alone  Confirm Follow Up Transport: Family  The Patient and/or Patient Representative was Provided with a Choice of Provider and Agrees with the Discharge Plan?: Yes  Freedom of Choice List was Provided with Basic Dialogue that Supports the Patient's Individualized Plan of Care/Goals, Treatment Preferences and Shares the Quality Data Associated with the Providers?: Yes  Discharge Location  Discharge Placement: Home with home health        Chava Payton RN - Outcomes Manager  [phone]

## 2021-03-30 NOTE — PROGRESS NOTES
OCCUPATIONAL THERAPY EVALUATION/DISCHARGE    Patient: Peter Santos (89 y.o. female)  Date: 3/30/2021  Primary Diagnosis: Status post lumbar laminectomy [Z98.890]  Procedure(s) (LRB):  L4/5 LAMINECTOMY FUSION/TRANSFORAMINAL LUMBAR INTERBODY FUSION (TLIF)/C-ARM/NUVASIVE/GLOBUS (N/A) 1 Day Post-Op   Precautions:   Fall, Spinal  PLOF: Pt reports being independent with ADLs and functional mobility. ASSESSMENT AND RECOMMENDATIONS:  Based on the objective data described below, the patient presents with decreased endurance, pain in lower back, spinal precautions s/p laminectomy, decreased ability to reach her lower body while following precautions, limiting her participation and independence with ADLs. Pt was able to recall 100% of her spinal precautions, educated on how her precautions relate to ADLs and functional bathroom mobility, pt verbalized understanding, was able to perform UB ADLs with Supervision/set-up, required Min A for LB ADLs. Pt reports she has AE for LB ADLs at home which her  used to utilize for dressing and bathing. Pt verbalized appropriate technique for AE use. Pt also will have sister stay with her to assist prn. Skilled occupational therapy in acute care is not indicated at this time. Discharge Recommendations: Home Health for safety check  Further Equipment Recommendations for Discharge: shower chair      SUBJECTIVE:   Patient stated I have all equipment except chair in the shower.     OBJECTIVE DATA SUMMARY:     Past Medical History:   Diagnosis Date    GERD (gastroesophageal reflux disease)     Hypothyroidism     Nausea & vomiting      Past Surgical History:   Procedure Laterality Date    HX CATARACT REMOVAL      HX HYSTERECTOMY      HX ORTHOPAEDIC Left     carpal tunnel wrist    HX PARTIAL HYSTERECTOMY      VASCULAR SURGERY PROCEDURE UNLIST Bilateral     varicose veins      Barriers to Learning/Limitations: None  Compensate with: visual, verbal, tactile, kinesthetic cues/model    Home Situation:   Home Situation  Home Environment: Private residence  # Steps to Enter: 4  Rails to Enter: Yes  Hand Rails : Right  Wheelchair Ramp: No  One/Two Story Residence: One story  Living Alone: Yes  Support Systems: Family member(s)(sister will be staying with her at home )  Patient Expects to be Discharged to[de-identified] Apartment  Current DME Used/Available at Home: Commode, bedside, Grab bars  Tub or Shower Type: Shower  []     Right hand dominant   [x]     Left hand dominant    Cognitive/Behavioral Status:  Neurologic State: Alert  Orientation Level: Oriented X4  Cognition: Follows commands; Appropriate decision making  Safety/Judgement: Awareness of environment; Fall prevention    Skin: visible skin intact  Edema: none noted    Vision/Perceptual:       Acuity: Within Defined Limits     Coordination: BUE  Coordination: Within functional limits  Fine Motor Skills-Upper: Left Intact; Right Intact    Gross Motor Skills-Upper: Left Intact; Right Intact  Balance:  Sitting: Intact  Standing: Intact  Strength: BUE  Strength: Generally decreased, functional(tested within spinal precautions)   Tone & Sensation: BUE  Tone: Normal  Sensation: Intact   Range of Motion: BUE  AROM: Generally decreased, functional   Functional Mobility and Transfers for ADLs:  Bed Mobility:   Scooting: Supervision(sitting in recliner, scooting towards edge of chair )  Transfers:  Sit to Stand: Supervision  Stand to Sit: Supervision   Toilet Transfer : Supervision      ADL Assessment:  Feeding: Modified independent  Oral Facial Hygiene/Grooming: Modified Independent  Bathing: Stand-by assistance  Upper Body Dressing: Supervision  Lower Body Dressing: Minimum assistance  Toileting: Stand by assistance   ADL Intervention:   Upper Body Bathing  Bathing Assistance: Supervision    Lower Body Bathing  Bathing Assistance: Contact guard assistance  Lower Body : Minimum assistance    Upper Body Dressing Assistance  Dressing Assistance: Supervision    Lower Body Dressing Assistance  Pants With Elastic Waist: Supervision  Socks: Minimum assistance   Cognitive Retraining  Safety/Judgement: Awareness of environment; Fall prevention  Pain:  Pain level pre-treatment: 3/10   Pain level post-treatment: 3/10     Activity Tolerance:   Fair  Please refer to the flowsheet for vital signs taken during this treatment. After treatment:   [x]  Patient left in no apparent distress sitting up in chair  []  Patient left in no apparent distress in bed  [x]  Call bell left within reach  [x]  Nursing notified  []  Caregiver present  []  Bed alarm activated    COMMUNICATION/EDUCATION:   []      Role of Occupational Therapy in the acute care setting  [x]      Home safety education was provided and the patient/caregiver indicated understanding. [x]      Patient/family have participated as able and agree with findings and recommendations. []      Patient is unable to participate in plan of care at this time. Thank you for this referral.  Allyson Flowers OTR/L  Time Calculation: 23 mins      Eval Complexity: History: LOW Complexity : Brief history review ; Examination: LOW Complexity : 1-3 performance deficits relating to physical, cognitive , or psychosocial skils that result in activity limitations and / or participation restrictions ;    Decision Making:LOW Complexity : No comorbidities that affect functional and no verbal or physical assistance needed to complete eval tasks

## 2021-03-30 NOTE — OP NOTES
[facility]  OPERATIVE REPORT    Name:  Sally Harada  MR#:   [id number]  :  1960  ACCOUNT #:  [de-identified]  DATE OF SERVICE:  2021    PREOPERATIVE DIAGNOSES:  Degenerative spondylolisthesis, L4-L5; degenerative spinal stenosis; cauda equina syndrome. POSTOPERATIVE DIAGNOSES:  Degenerative spondylolisthesis, L4-L5; degenerative spinal stenosis; cauda equina syndrome. PROCEDURE PERFORMED:  L4-L5 bilateral hemilaminectomy; medial facetectomy; foraminotomy; L4-L5 transforaminal lumbar interbody fusion with expandable titanium cage, Globus type; segmental spinal instrumentation, L4-L5, NuVasive; intraoperative neuromonitoring with NeuroVision system. SURGEON:  Rainelle Kanner, MD    ASSISTANT:  None. ANESTHESIA:  General endotracheal.    COMPLICATIONS:  None. SPECIMENS REMOVED:  None. IMPLANTS:  NuVasive pedicle screw system, Globus interbody cage. ESTIMATED BLOOD LOSS:  200 mL. FINDINGS:  We have clean neuromonitoring throughout the procedure. Bone quality was fair at best.  We were able to resolve the central lateral recess stenosis and provide anterior column support with an expandable cage and the anterior grafting. Percutaneous screws were placed at L4-L5. All surgery was made more difficult by the patient's morbid obesity with a BMI of 45. DESCRIPTION OF PROCEDURE:  Following induction of endotracheal anesthesia, the patient was turned to prone position on a spinal frame. The patient was prepped and draped in usual fashion. Fluoroscopic imaging demonstrated a fixed angulatory deformity at L4-L5. The patient was prepped and draped in usual fashion. Midline incision was made. A paramedian incision was made in the lumbodorsal fascia. Tubular retractor was placed down on the right hand side covering the L4-L5 interlaminar space and facet.   A laminectomy with subtotal facetectomy and foraminotomy done for decompression and to mobilize that segment to try to make sure the scoliotic curvature resolved. Thorough decompression was accomplished. The facet and intertransverse region was grafted. Attention then turned to the contralateral side on the patient's more symptomatic right hand side. Split tubular retractor was again placed. At this time on the interlaminar space on the right, a laminectomy, subtotal facetectomy, foraminotomy was done, resection of interval ligamentum flavum, mobilization of the neural elements. A diskectomy was done on the right. Disc was debulked. I have resected the right anterior column support. The endplates were prepared. The disc space was filled with cancellous bone graft, autograft as well as demineralized bone matrix. An expandable Globus cage 12 with 26 length was expanded probably to 14 mm with a 10-degree lordosis which nearly probably to resolve the patient's listhesis, normalized the disc space height, and provided graft and anterior column support. Because of the patient's obesity, this midline decompression could be done through a small incision, but for appropriate pedicles fixation, separate incisions were made bilaterally for placement of first a Jamshidi with guidewires transpedicularly with neuromonitoring and fluoroscopic guidance and then the 7.5 screws were placed at L5 and 6.5 screws in L4 with acceptable fixation. Lordosed rods were placed in the screw heads with final tightening and torquing done. Throughout the case, neuromonitoring remained quiet. There was no evidence of neural injury. The wounds were irrigated. A deep drain was placed. Vancomycin powder instilled for infection prophylaxis. Lumbodorsal fascia was closed with #1 Vicryl, subcutaneous tissues were closed with 2-0 Vicryl and the skin was closed with a 4-0 Monocryl subcuticular suture and Dermabond. A sterile occlusive dressing was placed upon the wound. All counts were correct.       MD JOSE C Pate/[email or web address]  D: 03/29/2021 16:01  T:  03/30/2021 1:43  JOB #:  [id number]

## 2021-03-30 NOTE — PROGRESS NOTES
Patient cleared by therapy and wishes to go home. Would like to leave drain another day.   Will dc home with office follow up tomorrow for drain removal.

## 2021-03-30 NOTE — PROGRESS NOTES
Orthopedic Coordinator Rounding Note    2021  Admit Date: 3/29/2021  Admit Diagnosis: Status post lumbar laminectomy [Z98.890]  Procedure: Procedure(s):  L4/5 LAMINECTOMY FUSION/TRANSFORAMINAL LUMBAR INTERBODY FUSION (TLIF)/C-ARM/NUVASIVE/GLOBUS  Post Op day: 1 Day Post-Op    Vital Signs:    Blood pressure 124/71, pulse (!) 57, temperature 97.8 °F (36.6 °C), resp. rate 18, height 5' 4\" (1.626 m), weight 118.4 kg (261 lb), SpO2 97 %. Temp (24hrs), Av.4 °F (36.3 °C), Min:97 °F (36.1 °C), Max:97.8 °F (36.6 °C)    PAIN RELIEF: complete resolution of pain with current prescribed medications. I/O   0701 -  1900  In: 340 [P.O.:340]  Out: 840 [Urine:750; Drains:90]   190 -  0700  In: 1440 [P.O.:240; I.V.:1200]  Out: 830 [Urine:600; Drains:230]    Surgical Wound: Incision 21 Back (Active)   Dressing Status Breakthrough drainage noted 21 0845   Dressing/Treatment Tegaderm/Transparent film dressing 21 0845   Drainage Amount Scant 21 0845   Number of days: 1         PT/OT:   PATIENT MOBILITY    Bed Mobility  Scooting: Supervision(sitting in recliner, scooting towards edge of chair )  Transfers  Sit to Stand: Supervision  Stand to Sit: Supervision      Gait  Base of Support: Widened  Speed/Staci: Slow, Shuffled  Step Length: Right shortened, Left shortened  Gait Abnormalities: Decreased step clearance  Ambulation - Level of Assistance: Stand-by assistance  Distance (ft): 200 Feet (ft)  Assistive Device: Walker, rolling  Rail Use: Right (while ascending )  Stairs - Level of Assistance: Stand-by assistance  Number of Stairs Trained: 4            Discharge To: HOME  Patient sitting in recliner at the time of my visit. JONATAN drain with serosanguinous fluid. Discharge medications reviewed with the patient and appropriate educational materials and side effects teaching were provided along with surgery specific patient education guidebook.    Reviewed activity orders and incentive spirometry with return demonstration. Opportunity for questions and clarification provided.     Beverly Horta, HESHAMN, RN, [address] [facility]  Orthopedic

## 2021-03-30 NOTE — PROGRESS NOTES
Problem: Mobility Impaired (Adult and Pediatric)  Goal: *Acute Goals and Plan of Care (Insert Text)  Description: Physical Therapy Goals  Initiated 3/30/2021 and to be accomplished within 7 day(s)  1. Patient will move from supine to sit and sit to supine , scoot up and down, and roll side to side in bed with independence. 2.  Patient will transfer from bed to chair and chair to bed with modified independence using the least restrictive device. 3.  Patient will perform sit to stand with modified independence. 4.  Patient will ambulate with modified independence for 400 feet with the least restrictive device. 5.  Patient will ascend/descend 4 stairs with unilateral handrail(s) with modified independence. PLOF: Pt reporting independent at home with no AD, lives in 1 story house with 4 ZURI with RHR while ascending. Sister will be staying with her at her house after discharge. Outcome: Progressing Towards Goal    PHYSICAL THERAPY EVALUATION    Patient: Anne Marie Alvarenga (12 y.o. female)  Date: 3/30/2021  Primary Diagnosis: Status post lumbar laminectomy [Z98.890]  Procedure(s) (LRB):  L4/5 LAMINECTOMY FUSION/TRANSFORAMINAL LUMBAR INTERBODY FUSION (TLIF)/C-ARM/NUVASIVE/GLOBUS (N/A) 1 Day Post-Op   Precautions:   Fall, Spinal    PLOF: see above     ASSESSMENT : Pt cleared to participate in PT session. Based on the objective data described below, the patient presents with decreased balance reactions, gait deviations, increased pain, and decreased independence in functional mobility. Patient is 55 yo female admitted to hospital for L4/L5 lami and presents today reclined in chair and agreeable to therapy. Patient was educated on spinal precautions, pt able to verbalize 3/3, reporting independence with log roll from bed, declining to practice during PT session.  Patient was given demo with instruction on sit <> stand transfer and gait training and transferred to standing with SBA and ambulated x200 feet with slow gait pattern. Pt also ascending/descending 4 training stairs with R HR while ascending with SBA. Patient demonstrated good compliance with precautions throughout session. At conclusion of session patient transferred to sitting in recliner and was left resting with call bell by the side. Patient instructed to call for assistance if they needed to get up for any reason and denied need for further assistance. Pt safe for discharge home with recommendations for Home Health with family assist and RW. Will keep on caseload if patient continues to be admitted. Patient will benefit from skilled intervention to address the above impairments. Patient's rehabilitation potential is considered to be Good  Factors which may influence rehabilitation potential include:   [x]         None noted  []         Mental ability/status  []         Medical condition  []         Home/family situation and support systems  []         Safety awareness  []         Pain tolerance/management  []         Other:      PLAN :  Recommendations and Planned Interventions:   [x]           Bed Mobility Training             []    Neuromuscular Re-Education  [x]           Transfer Training                   []    Orthotic/Prosthetic Training  [x]           Gait Training                          []    Modalities  [x]           Therapeutic Exercises           []    Edema Management/Control  [x]           Therapeutic Activities            [x]    Family Training/Education  [x]           Patient Education  []           Other (comment):    Frequency/Duration: Patient will be followed by physical therapy 1-2 times per day/4-7 days per week to address goals. Discharge Recommendations: Home Health with family assist   Further Equipment Recommendations for Discharge: rolling walker     SUBJECTIVE:   Patient stated I just haven't really been walking yet.     OBJECTIVE DATA SUMMARY:     Past Medical History:   Diagnosis Date    GERD (gastroesophageal reflux disease)     Hypothyroidism     Nausea & vomiting      Past Surgical History:   Procedure Laterality Date    HX CATARACT REMOVAL      HX HYSTERECTOMY      HX ORTHOPAEDIC Left     carpal tunnel wrist    HX PARTIAL HYSTERECTOMY      VASCULAR SURGERY PROCEDURE UNLIST Bilateral     varicose veins      Barriers to Learning/Limitations: None  Compensate with: N/A  Home Situation:  Home Situation  Home Environment: Private residence  # Steps to Enter: 4  Rails to Enter: Yes  Hand Rails : Right  Wheelchair Ramp: No  One/Two Story Residence: One story  Living Alone: Yes  Support Systems: Family member(s)(sister will be staying with her at home )  Patient Expects to be Discharged to[de-identified] Apartment  Current DME Used/Available at Home: Com[employer], bedside  Critical Behavior:  Neurologic State: Alert  Orientation Level: Oriented X4  Cognition: Appropriate decision making; Follows commands  Safety/Judgement: Awareness of environment; Fall prevention  Psychosocial  Patient Behaviors: Calm; Cooperative    Strength:    Strength: Generally decreased, functional    Tone & Sensation:   Tone: Normal    Sensation: Intact    Range Of Motion:  AROM: Within functional limits(BLEs )    Posture:  Posture (WDL): Within defined limits     Functional Mobility:  Bed Mobility:    Scooting: Supervision(sitting in recliner, scooting towards edge of chair )  Transfers:  Sit to Stand: Stand-by assistance  Stand to Sit: Stand-by assistance    Balance:   Sitting: Intact  Standing: Intact; With support    Ambulation/Gait Training:  Distance (ft): 200 Feet (ft)  Assistive Device: Walker, rolling  Ambulation - Level of Assistance: Stand-by assistance     Gait Description (WDL): Exceptions to WDL  Gait Abnormalities: Decreased step clearance    Base of Support: Widened     Speed/Staci: Slow;Shuffled  Step Length: Right shortened;Left shortened    Stairs:  Number of Stairs Trained: 4  Stairs - Level of Assistance: Stand-by assistance  Rail Use: Right (while ascending )    Pain:  Pain level pre-treatment: 6/10   Pain level post-treatment: 6/10   Pain Intervention(s) : Rest, Repositioning  Response to intervention: Nurse notified    Activity Tolerance:   Good activity tolerance, no reports of fatigue, ambulating and stair training completed. Please refer to the flowsheet for vital signs taken during this treatment. After treatment:   [x]         Patient left in no apparent distress sitting up in chair  []         Patient left in no apparent distress in bed  [x]         Call bell left within reach  [x]         Nursing notified  []         Caregiver present  []         Bed alarm activated  []         SCDs applied    COMMUNICATION/EDUCATION:   [x]         Role of Physical Therapy in the acute care setting. [x]         Fall prevention education was provided and the patient/caregiver indicated understanding. [x]         Patient/family have participated as able in goal setting and plan of care. [x]         Patient/family agree to work toward stated goals and plan of care. []         Patient understands intent and goals of therapy, but is neutral about his/her participation. []         Patient is unable to participate in goal setting/plan of care: ongoing with therapy staff.  []         Other:     Thank you for this referral.  Whitley Plummer, PT   Time Calculation: 14 mins      Eval Complexity: History: MEDIUM  Complexity : 1-2 comorbidities / personal factors will impact the outcome/ POC Exam:LOW Complexity : 1-2 Standardized tests and measures addressing body structure, function, activity limitation and / or participation in recreation  Presentation: LOW Complexity : Stable, uncomplicated  Clinical Decision Making:Low Complexity low  Overall Complexity:LOW

## 2021-03-30 NOTE — ANESTHESIA POSTPROCEDURE EVALUATION
Procedure(s):  L4/5 LAMINECTOMY FUSION/TRANSFORAMINAL LUMBAR INTERBODY FUSION (TLIF)/C-ARM/NUVASIVE/GLOBUS.     general    Anesthesia Post Evaluation      Multimodal analgesia: multimodal analgesia used between 6 hours prior to anesthesia start to PACU discharge  Patient location during evaluation: PACU  Patient participation: complete - patient participated  Level of consciousness: awake  Pain score: 4  Pain management: adequate  Airway patency: patent  Anesthetic complications: no  Cardiovascular status: acceptable  Respiratory status: acceptable  Hydration status: acceptable  Post anesthesia nausea and vomiting:  none  Final Post Anesthesia Temperature Assessment:  Normothermia (36.0-37.5 degrees C)      INITIAL Post-op Vital signs:   Vitals Value Taken Time   /80 03/29/21 1742   Temp 36.4 °C (97.5 °F) 03/29/21 1623   Pulse 48 03/29/21 1748   Resp 11 03/29/21 1748   SpO2 99 % 03/29/21 1748

## 2021-03-30 NOTE — DISCHARGE INSTRUCTIONS
Patient Education        Surgical Drain Care: Care Instructions  Your Care Instructions     After a surgery, fluid may collect inside your body in the surgical area. This makes an infection or other problems more likely. A surgical drain allows the fluid to flow out. The doctor puts a thin, flexible rubber tube into the area of your body where the fluid is likely to collect. The rubber tube carries the fluid outside your body. The most common type of surgical drain carries the fluid into a collection bulb that you empty. This is called a Marcus-De Los Santos (JONATAN) drain. The drain uses suction created by the bulb to pull the fluid from your body into the bulb. The rubber tube will probably be held in place by one or two stitches in your skin. The bulb will probably be attached with a safety pin to your clothes or near the bandage so that it doesn't flip around or pull on the stitches. Another type of drain is called a Penrose drain. This type of drain doesn't have a bulb. Instead, the end of the tube is open. That allows the fluid to drain onto a dressing taped to your skin. The drain may be kept in place next to your skin with a stitch or a safety pin in the tube. When you first get the drain, the fluid will be bloody. It will change color from red to pink to a light yellow or clear as the wound heals and the fluid starts to go away. Your doctor may give you information on when you no longer need the drain and when it will be removed. Follow-up care is a key part of your treatment and safety. Be sure to make and go to all appointments, and call your doctor if you are having problems. It's also a good idea to know your test results and keep a list of the medicines you take. How do you empty the bulb of a Marcus-De Los Santos drain? Follow any instructions your doctor gives you. How often you empty the bulb depends on how much fluid is draining. Empty the bulb when it is half full.   1. Wash your hands with soap and water.  2. Take the plug out of the bulb. 3. Empty the bulb. If your doctor asks you to measure the fluid, empty the fluid into a measuring cup, and write down the color and how much you collected. Your doctor will want to know this information. 4. Clean the plug with alcohol. 5. Squeeze the bulb until it is flat. This removes all the air from the bulb. You may need to put the bulb on a table or a counter to flatten it. 6. Keep the bulb flat, and put the plug in. The bulb should stay flat after you put the plug back in. This creates the suction that pulls the fluid into the bulb. 7. Empty the fluid into the toilet. 8. Wash your hands. How do you change the dressing around your surgical drain? You may have a dressing (bandage). The dressing is often made of gauze pads held on with tape. Your doctor will tell you how often to change it. 1. Wash your hands with soap and water. 2. Take off the dressing from around the drain. 3. Clean the drain site and the skin around it with soap and water. Use gauze or a cotton swab. 4. When the site is dry, put on a new dressing. The way your dressing is put on depends on what kind of drain you have. You will get instructions for your type of drain. 5. Wash your hands again with soap and water. Your doctor may ask you to keep track of your dressing changes. Write down the time of day and the amount and color of the fluid on the dressing. How do you help prevent clogs in your surgical drain? Squeezing or \"milking\" the tube of your surgical drain can help prevent clogs so that it drains correctly. Your doctor will tell you when you need to do this. In general, you do this when:  · You see a clot in the tube that prevents fluid from draining. The clot may look like a dark, stringy lining. · You see fluid leaking around the tube where it goes into the skin. Follow these steps for milking the tube.   1. Use one hand to hold and pinch the tube where it leaves the skin.  2. With the thumb and first finger of your other hand, pinch the tube just below where you're holding it. 3. Slowly and firmly push your thumb and first finger down the tubing toward the end of the tube. 4. Repeat this as many times as needed to move the clot. If you have a Marcus-De Los Santos (JONATAN) drain, the clot should move down the tube and into the bulb. If you have a Penrose drain, the clot should move into the dressing. When should you call for help? Call your doctor now or seek immediate medical care if:    · You have signs of infection, such as:  ? Increased pain, swelling, warmth, or redness around the area. ? Red streaks leading from the area. ? Pus draining from the area. ? A fever.     · You see a sudden change in the color or smell of the drainage.     · The tube is coming loose where it leaves your skin. Watch closely for changes in your health, and be sure to contact your doctor if:    · You see a lot of fluid around the drain.     · You cannot remove a clot from the tube by milking the tube. Where can you learn more? Go to http://www.gray.com/  Enter K117 in the search box to learn more about \"Surgical Drain Care: Care Instructions. \"  Current as of: June 26, 2019               Content Version: 12.6  © 5465-7013 [employer]. Care instructions adapted under license by [employer] (which disclaims liability or warranty for this information). If you have questions about a medical condition or this instruction, always ask your healthcare professional. [place] [address] any warranty or liability for your use of this information.

## 2021-03-30 NOTE — PROGRESS NOTES
Sent perfect serve message to Dr Everett Batista requesting order for shower chair. Order received and entered. Clinicals faxed to [facility] for home delivery of shower chair. Discharge order noted for today. Pt has been referred to [facility] agency. Met with patient and she is agreeable to the transition plan today. Transport has been arranged through family. Patient's discharge summary and home health  orders have been forwarded to [facility] via [place] [id number]. Updated bedside RN, Dina,  to the transition plan.   Discharge information has been documented on the AVS.       Stevo Corona RN - Outcomes Manager  [phone]

## 2021-03-30 NOTE — PROGRESS NOTES
conducted an initial consultation and Spiritual Assessment for Ronda Chowdhury, who is a 56 y.o.,female. Patients Primary Language is: [place]. According to the patients EMR Synagogue Affiliation is: No preference. The reason the Patient came to the hospital is:   Patient Active Problem List    Diagnosis Date Noted    Status post lumbar laminectomy 03/29/2021    GERD (gastroesophageal reflux disease)     Hypothyroidism     Back pain 11/20/2020    Essential hypertension 11/20/2020        The  provided the following Interventions:  Initiated a relationship of care and support with patient in room 2209 today on this day one post surgery. Found patient setting up in a chair watching television. Patient related that she was still in right much pain but she is hopeing it will ease with pain medication as this was her first real time out of bed for any length of time. Provided information about Spiritual Care Services. Offered prayer and assurance of continued prayers on patients behalf. The following outcomes were achieved:  Patient shared limited information about her medical narrative and spiritual journey/beliefs. Patient processed feeling about current hospitalization. Patient expressed gratitude for pastoral care visit. Assessment:  Patient does not have any Moravian/cultural needs that will affect patients preferences in health care. There are no further spiritual or Moravian issues which require Spiritual Care Services interventions at this time. Plan:  Chaplains will continue to follow and will provide pastoral care on an as needed/requested basis    . Mamadou Macias   Spiritual Care   [phone]

## 2021-03-30 NOTE — PROGRESS NOTES
Problem: Falls - Risk of  Goal: *Absence of Falls  Description: Document Aziza Fall Risk and appropriate interventions in the flowsheet.  Outcome: Progressing Towards Goal  Note: Fall Risk Interventions:  Mobility Interventions: Communicate number of staff needed for ambulation/transfer, Bed/chair exit alarm, Utilize walker, cane, or other assistive device         Medication Interventions: Teach patient to arise slowly, Patient to call before getting OOB, Evaluate medications/consider consulting pharmacy    Elimination Interventions: Call light in reach, Patient to call for help with toileting needs, Toilet paper/wipes in reach, Stay With Me (per policy)              Problem: Patient Education: Go to Patient Education Activity  Goal: Patient/Family Education  Outcome: Progressing Towards Goal     Problem: Infection - Risk of, Surgical Site Infection  Goal: *Absence of surgical site infection signs and symptoms  Outcome: Progressing Towards Goal     Problem: Patient Education: Go to Patient Education Activity  Goal: Patient/Family Education  Outcome: Progressing Towards Goal     Problem: Pain  Goal: *Control of Pain  Outcome: Progressing Towards Goal     Problem: Patient Education: Go to Patient Education Activity  Goal: Patient/Family Education  Outcome: Progressing Towards Goal     Problem: Patient Education: Go to Patient Education Activity  Goal: Patient/Family Education  Outcome: Progressing Towards Goal     Problem: Patient Education: Go to Patient Education Activity  Goal: Patient/Family Education  Outcome: Progressing Towards Goal

## 2021-04-01 NOTE — PROGRESS NOTES
Received call from Jordyn Jasso at [facility] requesting patients height/weight and discharge summary.   Faxed summary to [phone] per her request.  Dena Otero RN - Outcomes Manager  [phone]

## 2021-04-05 ENCOUNTER — TELEPHONE (OUTPATIENT)
Dept: SURGICAL ICU | Age: 61
End: 2021-04-05

## 2021-04-19 RX ORDER — PANTOPRAZOLE SODIUM 40 MG/1
TABLET, DELAYED RELEASE ORAL
Qty: 90 TAB | Refills: 0 | Status: SHIPPED | OUTPATIENT
Start: 2021-04-19 | End: 2021-07-28

## 2021-05-24 RX ORDER — LEVOTHYROXINE SODIUM 100 UG/1
TABLET ORAL
Qty: 90 TABLET | Refills: 0 | Status: SHIPPED | OUTPATIENT
Start: 2021-05-24 | End: 2021-08-29

## 2021-06-10 ENCOUNTER — OFFICE VISIT (OUTPATIENT)
Dept: INTERNAL MEDICINE CLINIC | Age: 61
End: 2021-06-10
Payer: COMMERCIAL

## 2021-06-10 VITALS
SYSTOLIC BLOOD PRESSURE: 130 MMHG | RESPIRATION RATE: 20 BRPM | WEIGHT: 264.8 LBS | DIASTOLIC BLOOD PRESSURE: 88 MMHG | HEIGHT: 64 IN | BODY MASS INDEX: 45.21 KG/M2

## 2021-06-10 DIAGNOSIS — E03.9 ACQUIRED HYPOTHYROIDISM: Primary | ICD-10-CM

## 2021-06-10 DIAGNOSIS — K21.9 GASTROESOPHAGEAL REFLUX DISEASE WITHOUT ESOPHAGITIS: ICD-10-CM

## 2021-06-10 PROCEDURE — 99213 OFFICE O/P EST LOW 20 MIN: CPT | Performed by: INTERNAL MEDICINE

## 2021-06-10 NOTE — PROGRESS NOTES
Joseph Mackey is a 59 y.o. female presenting for reflux        No chief complaint on file. HPI  Molina Mike comes in she is recovering from her surgery 3 months ago. Still having a lot of pain particularly in her left lower lumbar region. She says Dr. Greg Pruett tells her that one of the screws is a little out of place from where he would like it he has scheduled her for further x-rays at her next appointment. She is still unable to work and tells me that he told her it could take 6 months to a year. It is frustrating for her who normally is a busy active person but she is learning to deal with it. She is walking at home but is not in a formal physical therapy program.  Reflux is okay as far as her symptoms go and she is clinically euthyroid      Past Medical History:   Diagnosis Date    GERD (gastroesophageal reflux disease)     Hypothyroidism     Nausea & vomiting         Current Outpatient Medications on File Prior to Visit   Medication Sig Dispense Refill    Euthyrox 100 mcg tablet Take 1 tablet by mouth once daily 90 Tablet 0    pantoprazole (PROTONIX) 40 mg tablet Take 1 tablet by mouth once daily for 90 days 90 Tab 0    aspirin (ASPIRIN) 325 mg tablet Take 325 mg by mouth daily. No current facility-administered medications on file prior to visit. ROS all systems are reviewed and negative except as noted in the history of present illness    Visit Vitals  Resp 20   Ht 5' 4\" (1.626 m)   Wt 264 lb 12.8 oz (120.1 kg)   BMI 45.45 kg/m²        Physical Exam well-developed overweight  woman alert oriented cooperative no acute distress normocephalic conjunctiva pink sclera anicteric neck supple no lymphadenopathy lungs bilaterally clear to auscultation heart regular rhythm no gallops or extrasystoles trace to 1+ chronic lower extremity edema    Assessment & Plan hopefully Molina Mike will gradually improve. I will see her for routine check in about 3 months.   Her blood pressure is okay but still the borderline and I think we would like to keep an eye on that. She should also probably be due for thyroid function studies.         Josh Redman., MD

## 2021-06-28 ENCOUNTER — TRANSCRIBE ORDER (OUTPATIENT)
Dept: SCHEDULING | Age: 61
End: 2021-06-28

## 2021-06-28 DIAGNOSIS — Z98.1 S/P SPINAL FUSION: Primary | ICD-10-CM

## 2021-06-28 DIAGNOSIS — T84.84XA PAINFUL ORTHOPAEDIC HARDWARE (HCC): ICD-10-CM

## 2021-07-06 ENCOUNTER — HOSPITAL ENCOUNTER (OUTPATIENT)
Dept: CT IMAGING | Age: 61
Discharge: HOME | End: 2021-07-06
Attending: PHYSICIAN ASSISTANT
Payer: COMMERCIAL

## 2021-07-06 DIAGNOSIS — Z98.1 S/P SPINAL FUSION: ICD-10-CM

## 2021-07-06 DIAGNOSIS — T84.84XA PAINFUL ORTHOPAEDIC HARDWARE (HCC): ICD-10-CM

## 2021-07-06 PROCEDURE — 72131 CT LUMBAR SPINE W/O DYE: CPT

## 2021-08-29 RX ORDER — LEVOTHYROXINE SODIUM 100 UG/1
TABLET ORAL
Qty: 90 TABLET | Refills: 0 | Status: SHIPPED | OUTPATIENT
Start: 2021-08-29 | End: 2021-11-28

## 2021-10-25 ENCOUNTER — OFFICE VISIT (OUTPATIENT)
Dept: INTERNAL MEDICINE CLINIC | Age: 61
End: 2021-10-25
Payer: COMMERCIAL

## 2021-10-25 VITALS
DIASTOLIC BLOOD PRESSURE: 82 MMHG | RESPIRATION RATE: 20 BRPM | HEIGHT: 64 IN | BODY MASS INDEX: 45.24 KG/M2 | WEIGHT: 265 LBS | SYSTOLIC BLOOD PRESSURE: 134 MMHG

## 2021-10-25 DIAGNOSIS — R03.0 ELEVATED BLOOD PRESSURE READING: ICD-10-CM

## 2021-10-25 DIAGNOSIS — E03.9 ACQUIRED HYPOTHYROIDISM: Primary | ICD-10-CM

## 2021-10-25 PROCEDURE — 99213 OFFICE O/P EST LOW 20 MIN: CPT | Performed by: INTERNAL MEDICINE

## 2021-10-25 RX ORDER — IBUPROFEN 800 MG/1
1 TABLET ORAL
COMMUNITY
Start: 2021-10-14

## 2021-10-25 NOTE — PROGRESS NOTES
Kevin Murphy is a 59 y.o. female presenting for gastroesophageal reflux disease          Chief Complaint   Patient presents with    Hypertension    Heartburn        HPI Anayeli Tineo comes in today and she is generally doing well. Her back is slowly getting better and Dr. Amanda Barksdale has told her to be patient that it may take up to a year. She says she has just recently gone back to work full-time and she is pretty pooped by the end of the day but she is able to tolerate it. She is having a little bit of heartburn now but she is back on her pantoprazole and is waiting to see how that is going to work. In the meantime she is generally doing about the same. She is no longer taking any blood pressure medicine and she is still on her thyroid replacement which has not been checked in a while    Past Medical History:   Diagnosis Date    GERD (gastroesophageal reflux disease)     Hypothyroidism     Nausea & vomiting         Current Outpatient Medications on File Prior to Visit   Medication Sig Dispense Refill    ibuprofen (MOTRIN) 800 mg tablet Take 1 Tablet by mouth three (3) times daily as needed.  Euthyrox 100 mcg tablet Take 1 tablet by mouth once daily 90 Tablet 0    pantoprazole (PROTONIX) 40 mg tablet Take 1 tablet by mouth once daily 90 Tablet 3    aspirin (ASPIRIN) 325 mg tablet Take 325 mg by mouth daily. No current facility-administered medications on file prior to visit.         ROS all systems reviewed and negative  Visit Vitals  /82 (BP 1 Location: Right arm, BP Patient Position: Sitting, BP Cuff Size: Large adult)   Resp 20   Ht 5' 4\" (1.626 m)   Wt 265 lb (120.2 kg)   BMI 45.49 kg/m²        Physical Exam well-developed  woman alert oriented cooperative no acute distress normocephalic neck no lymphadenopathy or mass lungs clear to auscultation bilaterally heart regular rhythm no gallops or extrasystoles extremities no edema    Assessment & Plan she still following up regularly with Dr. Concetta Bernal. Blood pressure is borderline and we need to keep an eye on that.   We have not checked thyroid levels in a while so we will check a T4 and a TSH to assess adequacy of her replacement and have her come back in 3 months        Mahendra Mederos MD

## 2021-10-28 ENCOUNTER — HOSPITAL ENCOUNTER (OUTPATIENT)
Dept: LAB | Age: 61
Discharge: HOME | End: 2021-10-28
Payer: COMMERCIAL

## 2021-10-28 LAB — TSH SERPL DL<=0.05 MIU/L-ACNC: 2.58 UIU/ML (ref 0.35–6.2)

## 2021-10-28 PROCEDURE — 84443 ASSAY THYROID STIM HORMONE: CPT

## 2021-10-28 PROCEDURE — 36415 COLL VENOUS BLD VENIPUNCTURE: CPT

## 2021-10-28 PROCEDURE — 84436 ASSAY OF TOTAL THYROXINE: CPT

## 2021-10-29 LAB — T4 SERPL-MCNC: 13.9 UG/DL (ref 4.8–13.9)

## 2022-03-15 ENCOUNTER — VIRTUAL VISIT (OUTPATIENT)
Dept: INTERNAL MEDICINE CLINIC | Age: 62
End: 2022-03-15

## 2022-03-17 NOTE — PROGRESS NOTES
This encounter was created in error - please disregard. Patient not seen. Appt was canceled by staff.

## 2022-03-19 PROBLEM — I10 ESSENTIAL HYPERTENSION: Status: ACTIVE | Noted: 2020-11-20

## 2022-03-20 PROBLEM — Z98.890 STATUS POST LUMBAR LAMINECTOMY: Status: ACTIVE | Noted: 2021-03-29

## 2022-03-20 PROBLEM — M54.9 BACK PAIN: Status: ACTIVE | Noted: 2020-11-20

## 2022-07-01 ENCOUNTER — OFFICE VISIT (OUTPATIENT)
Dept: INTERNAL MEDICINE CLINIC | Age: 62
End: 2022-07-01
Payer: COMMERCIAL

## 2022-07-01 VITALS
HEART RATE: 62 BPM | DIASTOLIC BLOOD PRESSURE: 84 MMHG | HEIGHT: 64 IN | RESPIRATION RATE: 20 BRPM | BODY MASS INDEX: 43.57 KG/M2 | WEIGHT: 255.2 LBS | OXYGEN SATURATION: 96 % | SYSTOLIC BLOOD PRESSURE: 128 MMHG | TEMPERATURE: 97.6 F

## 2022-07-01 DIAGNOSIS — E66.01 OBESITY, CLASS III, BMI 40-49.9 (MORBID OBESITY) (HCC): ICD-10-CM

## 2022-07-01 DIAGNOSIS — Z23 ENCOUNTER FOR IMMUNIZATION: ICD-10-CM

## 2022-07-01 DIAGNOSIS — M13.0 POLYARTHROPATHY: ICD-10-CM

## 2022-07-01 DIAGNOSIS — E03.9 ACQUIRED HYPOTHYROIDISM: ICD-10-CM

## 2022-07-01 DIAGNOSIS — I25.10 CORONARY ARTERY DISEASE INVOLVING NATIVE CORONARY ARTERY OF NATIVE HEART WITHOUT ANGINA PECTORIS: ICD-10-CM

## 2022-07-01 DIAGNOSIS — I10 ESSENTIAL HYPERTENSION: Primary | ICD-10-CM

## 2022-07-01 DIAGNOSIS — G62.9 POLYNEUROPATHY: ICD-10-CM

## 2022-07-01 DIAGNOSIS — R73.9 BLOOD GLUCOSE ELEVATED: ICD-10-CM

## 2022-07-01 DIAGNOSIS — Z12.11 SCREEN FOR COLON CANCER: ICD-10-CM

## 2022-07-01 DIAGNOSIS — R82.90 MALODOROUS URINE: ICD-10-CM

## 2022-07-01 DIAGNOSIS — K21.9 GASTROESOPHAGEAL REFLUX DISEASE WITHOUT ESOPHAGITIS: ICD-10-CM

## 2022-07-01 DIAGNOSIS — Z11.59 NEED FOR HEPATITIS C SCREENING TEST: ICD-10-CM

## 2022-07-01 LAB — HBA1C MFR BLD HPLC: 5.5 %

## 2022-07-01 PROCEDURE — 83036 HEMOGLOBIN GLYCOSYLATED A1C: CPT | Performed by: FAMILY MEDICINE

## 2022-07-01 PROCEDURE — 90471 IMMUNIZATION ADMIN: CPT | Performed by: FAMILY MEDICINE

## 2022-07-01 PROCEDURE — 99204 OFFICE O/P NEW MOD 45 MIN: CPT | Performed by: FAMILY MEDICINE

## 2022-07-01 PROCEDURE — 90715 TDAP VACCINE 7 YRS/> IM: CPT | Performed by: FAMILY MEDICINE

## 2022-07-01 RX ORDER — ASPIRIN 81 MG/1
81 TABLET ORAL DAILY
Qty: 90 TABLET | Refills: 1 | Status: SHIPPED | OUTPATIENT
Start: 2022-07-01

## 2022-07-01 RX ORDER — PANTOPRAZOLE SODIUM 40 MG/1
40 TABLET, DELAYED RELEASE ORAL DAILY
Qty: 90 TABLET | Refills: 3 | Status: SHIPPED | OUTPATIENT
Start: 2022-07-01

## 2022-07-01 NOTE — PROGRESS NOTES
Verbal order from Severa Halon, MD to order labs/sign and draw them in office    Labs were drawn and sent to [facility] by Nikki Fernandez LPN:    The following tubes were sent:    1 Lavendar, 0 Red, 3 SST, 0 Urine    Draw site right antecubital.  Patient tolerated draw with no distress. Fingerstick for HBa1C done in right  middle finger by Nikki Fernandez LPN per order of Dr. Myke Smith after cleaning area with alcohol wipe. Patient tolerated procedure well. Ellen Khan presents today for   Chief Complaint   Patient presents with    New Patient       Is someone accompanying this pt? no  Is the patient using any DME equipment during OV? no    Depression Screening:  3 most recent PHQ Screens 7/1/2022   Little interest or pleasure in doing things Not at all   Feeling down, depressed, irritable, or hopeless Not at all   Total Score PHQ 2 0       Learning Assessment:  Learning Assessment 11/20/2020   PRIMARY LEARNER Patient   HIGHEST LEVEL OF EDUCATION - PRIMARY LEARNER  DID NOT GRADUATE HIGH SCHOOL   BARRIERS PRIMARY LEARNER NONE   CO-LEARNER CAREGIVER No   PRIMARY LANGUAGE ENGLISH   LEARNER PREFERENCE PRIMARY DEMONSTRATION   ANSWERED BY patient   RELATIONSHIP SELF       Fall Risk  Fall Risk Assessment, last 12 mths 2/19/2021   Able to walk? Yes   Fall in past 12 months? 0   Do you feel unsteady?  0   Are you worried about falling 0       ADL  ADL Assessment 7/1/2022   Feeding yourself No Help Needed   Getting from bed to chair No Help Needed   Getting dressed No Help Needed   Bathing or showering No Help Needed   Walk across the room (includes cane/walker) No Help Needed   Using the telphone No Help Needed   Taking your medications No Help Needed   Preparing meals No Help Needed   Managing money (expenses/bills) No Help Needed   Moderately strenuous housework (laundry) No Help Needed   Shopping for personal items (toiletries/medicines) No Help Needed   Shopping for groceries No Help Needed   Driving No Help Needed Climbing a flight of stairs No Help Needed   Getting to places beyond walking distances No Help Needed       Health Maintenance reviewed and discussed and ordered per Provider. Health Maintenance Due   Topic Date Due    Hepatitis C Screening  Never done    Lipid Screen  Never done    Shingrix Vaccine Age 50> (1 of 2) Never done    Low dose CT lung screening  Never done    DTaP/Tdap/Td series (1 - Tdap) 04/16/2016    Colorectal Cancer Screening Combo  Never done    COVID-19 Vaccine (3 - Booster for Moderna series) 02/22/2022   . Coordination of Care:  1. \"Have you been to the ER, urgent care clinic since your last visit? Hospitalized since your last visit? \" No    2. \"Have you seen or consulted any other health care providers outside of the [address] [place] [address] since your last visit? \" No     3. For patients aged 39-70: Has the patient had a colonoscopy? No   FIT test ordered    If the patient is female:    4. For patients aged 41-77: Has the patient had a mammogram within the past 2 years? Yes - no Care Gap present    5. For patients aged 21-65: Has the patient had a pap smear?  No total hysterectomy

## 2022-07-01 NOTE — PROGRESS NOTES
Gerri Royal (: 1960) is a 57 y.o. female here for evaluation of the following chief concerns(s):  New Patient; previously under the care of Dr. Greg Fisher (Internal Medicine). Chronic condition management    ASSESSMENT/PLAN:  1. Essential hypertension  -     METABOLIC PANEL, COMPREHENSIVE  -     CBC WITH AUTOMATED DIFF  2. Acquired hypothyroidism  -     LIPID PANEL  -     COLLECTION VENOUS BLOOD,VENIPUNCTURE  -     TSH 3RD GENERATION  3. Gastroesophageal reflux disease without esophagitis  -     pantoprazole (PROTONIX) 40 mg tablet; Take 1 Tablet by mouth daily. , Normal, Disp-90 Tablet, R-3  4. Coronary artery disease involving native coronary artery of native heart without angina pectoris  -     ECHO ADULT COMPLETE; Future  -     aspirin delayed-release 81 mg tablet; Take 1 Tablet by mouth daily. , Normal, Disp-90 Tablet, R-1  5. Polyneuropathy  -     VITAMIN B12  -     ANTINUCLEAR ANTIBODIES, IFA  6. Blood glucose elevated  -     AMB POC HEMOGLOBIN A1C  7. Obesity, Class III, BMI 40-49.9 (morbid obesity) ([facility] [address]  8. Polyarthropathy  -     ANTINUCLEAR ANTIBODIES, IFA  -     LYME AB, IGG & IGM BY WB  9. Malodorous urine  -     AMB POC URINALYSIS DIP STICK AUTO W/O MICRO  10. Screen for colon cancer  -     OCCULT BLOOD IMMUNOASSAY,DIAGNOSTIC  11. Need for hepatitis C screening test  -     HEPATITIS C AB  12. Encounter for immunization  -     TDAP, [facility], (AGE 10 YRS+), IM      Return in about 1 month (around 2022) for follow-up chronic conditions. Gerri Royal agrees with plan as above and has no additional questions at this time. SUBJECTIVE/OBJECTIVE:  Overall, pt is feeling \"OK\". Acute concerns: Paresthesias  Onset end of - unclear cause, pt felt this could be related to recent back surgery 2021- she states her surgeon has reassured her this is not the etiology. No focal weakness, fecal incontinence.      Occasional strong smelling urine, \"sulfur\"- when this happened she drinks water, use to help clear this but has not been as affect lately. Increased urinary urgency, occasional incontinence of urine. HTN:  Home BP monitoring: Checks at work- well controlled. Polyarthropathy:  Knees, ankles, wrists. Chronic NSAID use, but not every day. CAD:  Years ago required stress test, per pt hx of silent MI and it caused damage. Pt states she was placed on ASA 325mg, no statin therapy. Meds: See list below; full adherence, no side effects. Diet: \"I eat bad\"- she does not do a lot of cooking since her  passed away. Exercise: Active with work. Renal indices: Up to date/overdue  Dilated eye exam: Up to date/overdue    ROS: No CP, palp, SOB, abd pain, headache, dizziness, falls. +FLOR.  +Edema, chronic; follows w/ Dr. Jaxson Chavez, [address] [facility]. Past Medical History:   Diagnosis Date    GERD (gastroesophageal reflux disease)     Hypothyroidism     Nausea & vomiting      Past Surgical History:   Procedure Laterality Date    HX CATARACT REMOVAL      HX HYSTERECTOMY      HX ORTHOPAEDIC Left     carpal tunnel wrist    HX PARTIAL HYSTERECTOMY      VASCULAR SURGERY PROCEDURE UNLIST Bilateral     varicose veins      History reviewed. No pertinent family history.     Social History     Socioeconomic History    Marital status:      Spouse name: Not on file    Number of children: Not on file    Years of education: Not on file    Highest education level: Not on file   Occupational History    Not on file   Tobacco Use    Smoking status: Former Smoker     Packs/day: 1.00     Years: 20.00     Pack years: 20.00     Types: Cigarettes     Start date: 0     Quit date: 2020     Years since quittin.3    Smokeless tobacco: Never Used    Tobacco comment: patient doesn't smoke   Vaping Use    Vaping Use: Never used   Substance and Sexual Activity    Alcohol use: Yes     Comment: rare    Drug use: Never    Sexual activity: Not on file   Other Topics Concern    Not on file   Social History Narrative    Not on file     Social Determinants of Health     Financial Resource Strain:     Difficulty of Paying Living Expenses: Not on file   Food Insecurity:     Worried About Running Out of Food in the Last Year: Not on file    Iraj of Food in the Last Year: Not on file   Transportation Needs:     Lack of Transportation (Medical): Not on file    Lack of Transportation (Non-Medical): Not on file   Physical Activity:     Days of Exercise per Week: Not on file    Minutes of Exercise per Session: Not on file   Stress:     Feeling of Stress : Not on file   Social Connections:     Frequency of Communication with Friends and Family: Not on file    Frequency of Social Gatherings with Friends and Family: Not on file    Attends [employer] Services: Not on file    Active Member of [address] [place] [address] [employer] or Organizations: Not on file    Attends Club or Organization Meetings: Not on file    Marital Status: Not on file   Intimate Partner Violence:     Fear of Current or Ex-Partner: Not on file    Emotionally Abused: Not on file    Physically Abused: Not on file    Sexually Abused: Not on file   Housing Stability:     Unable to Pay for Housing in the Last Year: Not on file    Number of [place] in the Last Year: Not on file    Unstable Housing in the Last Year: Not on file     Social History     Tobacco Use   Smoking Status Former Smoker    Packs/day: 1.00    Years: 20.00    Pack years: 20.00    Types: Cigarettes    Start date: 0    Quit date: 2020    Years since quittin.3   Smokeless Tobacco Never Used   Tobacco Comment    patient doesn't smoke       Current Outpatient Medications   Medication Sig Dispense Refill    pantoprazole (PROTONIX) 40 mg tablet Take 1 Tablet by mouth daily. 90 Tablet 3    aspirin delayed-release 81 mg tablet Take 1 Tablet by mouth daily.  90 Tablet 1    Euthyrox 100 mcg tablet Take 1 tablet by mouth once daily 90 Tablet 3    ibuprofen (MOTRIN) 800 mg tablet Take 1 Tablet by mouth three (3) times daily as needed. Allergies   Allergen Reactions    Sulfa (Sulfonamide Antibiotics) Shortness of Breath       /84   Pulse 62   Temp 97.6 °F (36.4 °C)   Resp 20   Ht 5' 4\" (1.626 m)   Wt 255 lb 3.2 oz (115.8 kg)   SpO2 96%   BMI 43.80 kg/m²     Physical Exam    Lab Results   Component Value Date/Time    WBC 10.1 03/30/2021 01:13 AM    HGB 12.0 03/30/2021 01:13 AM    HCT 37.8 03/30/2021 01:13 AM    PLATELET 636 88/10/5179 01:13 AM    MCV 86.9 03/30/2021 01:13 AM     Lab Results   Component Value Date/Time    Sodium 141 03/24/2021 08:39 AM    Potassium 4.4 03/24/2021 08:39 AM    Chloride 110 03/24/2021 08:39 AM    CO2 26 03/24/2021 08:39 AM    Anion gap 5 03/24/2021 08:39 AM    Glucose 106 (H) 03/24/2021 08:39 AM    BUN 24 (H) 03/24/2021 08:39 AM    Creatinine 1.09 03/24/2021 08:39 AM    BUN/Creatinine ratio 22 (H) 03/24/2021 08:39 AM    GFR est AA >60 03/24/2021 08:39 AM    GFR est non-AA 51 (L) 03/24/2021 08:39 AM    Calcium 9.0 03/24/2021 08:39 AM     No results found for: CHOL, CHOLPOCT, CHOLX, CHLST, CHOLV, TOTCHOLEXT, HDL, HDLPOC, HDLEXT, HDLP, LDL, LDLCPOC, LDLCEXT, LDLC, DLDLP, VLDLC, VLDL, TGLX, TRIGL, TRIGLYCEXT, TRIGP, TGLPOCT, CHHD, CHHDX    On this date 07/01/22 I have spent* ** minutes reviewing previous notes, test results and face to face with the patient for interview/exam, discussing working diagnosis and treatment plan as well as documenting on the day of the visit. Medical decision making complexity: moderate-high.     Katina Burnett MD   Family & Geriatric Medicine

## 2022-07-14 ENCOUNTER — HOSPITAL ENCOUNTER (OUTPATIENT)
Dept: NON INVASIVE DIAGNOSTICS | Age: 62
Discharge: HOME | End: 2022-07-14
Attending: FAMILY MEDICINE
Payer: COMMERCIAL

## 2022-07-14 VITALS
HEIGHT: 64 IN | WEIGHT: 255 LBS | SYSTOLIC BLOOD PRESSURE: 142 MMHG | DIASTOLIC BLOOD PRESSURE: 80 MMHG | BODY MASS INDEX: 43.54 KG/M2

## 2022-07-14 DIAGNOSIS — I25.10 CORONARY ARTERY DISEASE INVOLVING NATIVE CORONARY ARTERY OF NATIVE HEART WITHOUT ANGINA PECTORIS: ICD-10-CM

## 2022-07-14 LAB
ECHO AO ASC DIAM: 3.4 CM
ECHO AO ASCENDING AORTA INDEX: 1.57 CM/M2
ECHO AO ROOT DIAM: 3.1 CM
ECHO AO ROOT INDEX: 1.43 CM/M2
ECHO AV AREA PEAK VELOCITY: 2.6 CM2
ECHO AV AREA VTI: 2.8 CM2
ECHO AV AREA/BSA PEAK VELOCITY: 1.2 CM2/M2
ECHO AV AREA/BSA VTI: 1.3 CM2/M2
ECHO AV MEAN GRADIENT: 6 MMHG
ECHO AV MEAN VELOCITY: 1.1 M/S
ECHO AV PEAK GRADIENT: 11 MMHG
ECHO AV PEAK VELOCITY: 1.7 M/S
ECHO AV VELOCITY RATIO: 0.65
ECHO AV VTI: 41 CM
ECHO EST RA PRESSURE: 3 MMHG
ECHO IVC PROX: 1.5 CM
ECHO LA DIAMETER INDEX: 2.03 CM/M2
ECHO LA DIAMETER: 4.4 CM
ECHO LA TO AORTIC ROOT RATIO: 1.42
ECHO LA VOL 2C: 83 ML (ref 22–52)
ECHO LA VOL 4C: 91 ML (ref 22–52)
ECHO LA VOL BP: 88 ML (ref 22–52)
ECHO LA VOL/BSA BIPLANE: 41 ML/M2 (ref 16–34)
ECHO LA VOLUME AREA LENGTH: 94 ML
ECHO LA VOLUME INDEX A2C: 38 ML/M2 (ref 16–34)
ECHO LA VOLUME INDEX A4C: 42 ML/M2 (ref 16–34)
ECHO LA VOLUME INDEX AREA LENGTH: 43 ML/M2 (ref 16–34)
ECHO LV E' LATERAL VELOCITY: 9 CM/S
ECHO LV E' SEPTAL VELOCITY: 9 CM/S
ECHO LV EDV A2C: 118 ML
ECHO LV EDV A4C: 143 ML
ECHO LV EDV BP: 133 ML (ref 56–104)
ECHO LV EDV INDEX A4C: 66 ML/M2
ECHO LV EDV INDEX BP: 61 ML/M2
ECHO LV EDV NDEX A2C: 54 ML/M2
ECHO LV EJECTION FRACTION A2C: 46 %
ECHO LV EJECTION FRACTION A4C: 63 %
ECHO LV EJECTION FRACTION BIPLANE: 55 % (ref 55–100)
ECHO LV ESV A2C: 63 ML
ECHO LV ESV A4C: 53 ML
ECHO LV ESV BP: 60 ML (ref 19–49)
ECHO LV ESV INDEX A2C: 29 ML/M2
ECHO LV ESV INDEX A4C: 24 ML/M2
ECHO LV ESV INDEX BP: 28 ML/M2
ECHO LV FRACTIONAL SHORTENING: 44 % (ref 28–44)
ECHO LV INTERNAL DIMENSION DIASTOLE INDEX: 2.63 CM/M2
ECHO LV INTERNAL DIMENSION DIASTOLIC: 5.7 CM (ref 3.9–5.3)
ECHO LV INTERNAL DIMENSION SYSTOLIC INDEX: 1.47 CM/M2
ECHO LV INTERNAL DIMENSION SYSTOLIC: 3.2 CM
ECHO LV IVSD: 1.2 CM (ref 0.6–0.9)
ECHO LV MASS 2D: 272.5 G (ref 67–162)
ECHO LV MASS INDEX 2D: 125.6 G/M2 (ref 43–95)
ECHO LV POSTERIOR WALL DIASTOLIC: 1.1 CM (ref 0.6–0.9)
ECHO LV RELATIVE WALL THICKNESS RATIO: 0.39
ECHO LVOT AREA: 3.8 CM2
ECHO LVOT AV VTI INDEX: 0.73
ECHO LVOT DIAM: 2.2 CM
ECHO LVOT MEAN GRADIENT: 3 MMHG
ECHO LVOT PEAK GRADIENT: 5 MMHG
ECHO LVOT PEAK VELOCITY: 1.1 M/S
ECHO LVOT STROKE VOLUME INDEX: 52.4 ML/M2
ECHO LVOT SV: 113.6 ML
ECHO LVOT VTI: 29.9 CM
ECHO MAIN PULMONARY ARTERY DIAMETER: 2.1 CM
ECHO MV A VELOCITY: 0.78 M/S
ECHO MV AREA PHT: 3 CM2
ECHO MV AREA VTI: 4.3 CM2
ECHO MV E DECELERATION TIME (DT): 182.8 MS
ECHO MV E VELOCITY: 0.78 M/S
ECHO MV E/A RATIO: 1
ECHO MV E/E' LATERAL: 8.67
ECHO MV E/E' RATIO (AVERAGED): 8.67
ECHO MV E/E' SEPTAL: 8.67
ECHO MV LVOT VTI INDEX: 0.89
ECHO MV MAX VELOCITY: 0.9 M/S
ECHO MV MEAN GRADIENT: 1 MMHG
ECHO MV MEAN VELOCITY: 0.5 M/S
ECHO MV PEAK GRADIENT: 4 MMHG
ECHO MV PRESSURE HALF TIME (PHT): 73 MS
ECHO MV VTI: 26.7 CM
ECHO PV MAX VELOCITY: 1 M/S
ECHO PV MEAN GRADIENT: 3 MMHG
ECHO PV MEAN VELOCITY: 0.8 M/S
ECHO PV PEAK GRADIENT: 4 MMHG
ECHO RIGHT VENTRICULAR SYSTOLIC PRESSURE (RVSP): 29 MMHG
ECHO RV FREE WALL PEAK S': 18 CM/S
ECHO RV INTERNAL DIMENSION: 3.5 CM
ECHO RV TAPSE: 2.4 CM (ref 1.7–?)
ECHO TV REGURGITANT MAX VELOCITY: 2.56 M/S
ECHO TV REGURGITANT PEAK GRADIENT: 26 MMHG

## 2022-07-14 PROCEDURE — 93306 TTE W/DOPPLER COMPLETE: CPT

## 2022-07-19 LAB
ALBUMIN SERPL-MCNC: 4.5 G/DL (ref 3.8–4.8)
ALBUMIN/GLOB SERPL: 1.7 {RATIO} (ref 1.2–2.2)
ALP SERPL-CCNC: 112 IU/L (ref 44–121)
ALT SERPL-CCNC: 23 IU/L (ref 0–32)
ANA SER QL IF: NEGATIVE
AST SERPL-CCNC: 34 IU/L (ref 0–40)
B BURGDOR IGG PATRN SER IB-IMP: NEGATIVE
B BURGDOR IGM PATRN SER IB-IMP: NEGATIVE
B BURGDOR18KD IGG SER QL IB: ABNORMAL
B BURGDOR23KD IGG SER QL IB: ABNORMAL
B BURGDOR23KD IGM SER QL IB: ABNORMAL
B BURGDOR28KD IGG SER QL IB: ABNORMAL
B BURGDOR30KD IGG SER QL IB: ABNORMAL
B BURGDOR39KD IGG SER QL IB: ABNORMAL
B BURGDOR39KD IGM SER QL IB: ABNORMAL
B BURGDOR41KD IGG SER QL IB: PRESENT
B BURGDOR41KD IGM SER QL IB: ABNORMAL
B BURGDOR45KD IGG SER QL IB: ABNORMAL
B BURGDOR58KD IGG SER QL IB: PRESENT
B BURGDOR66KD IGG SER QL IB: ABNORMAL
B BURGDOR93KD IGG SER QL IB: ABNORMAL
BASOPHILS # BLD AUTO: 0.1 X10E3/UL (ref 0–0.2)
BASOPHILS NFR BLD AUTO: 1 %
BILIRUB SERPL-MCNC: 0.6 MG/DL (ref 0–1.2)
BUN SERPL-MCNC: 18 MG/DL (ref 8–27)
BUN/CREAT SERPL: 18 (ref 12–28)
CALCIUM SERPL-MCNC: 9.7 MG/DL (ref 8.7–10.3)
CHLORIDE SERPL-SCNC: 106 MMOL/L (ref 96–106)
CHOLEST SERPL-MCNC: 181 MG/DL (ref 100–199)
CO2 SERPL-SCNC: 22 MMOL/L (ref 20–29)
CREAT SERPL-MCNC: 1.01 MG/DL (ref 0.57–1)
EGFR: 62 ML/MIN/1.73
EOSINOPHIL # BLD AUTO: 0.3 X10E3/UL (ref 0–0.4)
EOSINOPHIL NFR BLD AUTO: 6 %
ERYTHROCYTE [DISTWIDTH] IN BLOOD BY AUTOMATED COUNT: 13.9 % (ref 11.7–15.4)
GLOBULIN SER CALC-MCNC: 2.7 G/DL (ref 1.5–4.5)
GLUCOSE SERPL-MCNC: 108 MG/DL (ref 65–99)
HCT VFR BLD AUTO: 38.3 % (ref 34–46.6)
HCV AB S/CO SERPL IA: <0.1 S/CO RATIO (ref 0–0.9)
HDLC SERPL-MCNC: 64 MG/DL
HGB BLD-MCNC: 12.7 G/DL (ref 11.1–15.9)
IMM GRANULOCYTES # BLD AUTO: 0 X10E3/UL (ref 0–0.1)
IMM GRANULOCYTES NFR BLD AUTO: 0 %
LDLC SERPL CALC-MCNC: 104 MG/DL (ref 0–99)
LYMPHOCYTES # BLD AUTO: 1 X10E3/UL (ref 0.7–3.1)
LYMPHOCYTES NFR BLD AUTO: 22 %
MCH RBC QN AUTO: 27.7 PG (ref 26.6–33)
MCHC RBC AUTO-ENTMCNC: 33.2 G/DL (ref 31.5–35.7)
MCV RBC AUTO: 83 FL (ref 79–97)
MONOCYTES # BLD AUTO: 0.4 X10E3/UL (ref 0.1–0.9)
MONOCYTES NFR BLD AUTO: 9 %
NEUTROPHILS # BLD AUTO: 2.9 X10E3/UL (ref 1.4–7)
NEUTROPHILS NFR BLD AUTO: 62 %
PLATELET # BLD AUTO: 160 X10E3/UL (ref 150–450)
POTASSIUM SERPL-SCNC: 4.4 MMOL/L (ref 3.5–5.2)
PROT SERPL-MCNC: 7.2 G/DL (ref 6–8.5)
RBC # BLD AUTO: 4.59 X10E6/UL (ref 3.77–5.28)
SODIUM SERPL-SCNC: 141 MMOL/L (ref 134–144)
TRIGL SERPL-MCNC: 68 MG/DL (ref 0–149)
TSH SERPL DL<=0.005 MIU/L-ACNC: 1.64 UIU/ML (ref 0.45–4.5)
VIT B12 SERPL-MCNC: 245 PG/ML (ref 232–1245)
VLDLC SERPL CALC-MCNC: 13 MG/DL (ref 5–40)
WBC # BLD AUTO: 4.7 X10E3/UL (ref 3.4–10.8)

## 2022-08-05 ENCOUNTER — OFFICE VISIT (OUTPATIENT)
Dept: INTERNAL MEDICINE CLINIC | Age: 62
End: 2022-08-05
Payer: COMMERCIAL

## 2022-08-05 VITALS
DIASTOLIC BLOOD PRESSURE: 70 MMHG | SYSTOLIC BLOOD PRESSURE: 119 MMHG | TEMPERATURE: 97.1 F | HEART RATE: 59 BPM | RESPIRATION RATE: 20 BRPM | BODY MASS INDEX: 43.36 KG/M2 | OXYGEN SATURATION: 97 % | HEIGHT: 64 IN | WEIGHT: 254 LBS

## 2022-08-05 DIAGNOSIS — M13.0 POLYARTHROPATHY: Primary | ICD-10-CM

## 2022-08-05 DIAGNOSIS — R20.2 PARESTHESIA: ICD-10-CM

## 2022-08-05 DIAGNOSIS — E53.8 B12 DEFICIENCY: ICD-10-CM

## 2022-08-05 DIAGNOSIS — Z12.31 ENCOUNTER FOR SCREENING MAMMOGRAM FOR MALIGNANT NEOPLASM OF BREAST: ICD-10-CM

## 2022-08-05 DIAGNOSIS — Z86.19 HISTORY OF LYME DISEASE: ICD-10-CM

## 2022-08-05 PROCEDURE — 99214 OFFICE O/P EST MOD 30 MIN: CPT | Performed by: FAMILY MEDICINE

## 2022-08-05 NOTE — PROGRESS NOTES
Erin Pedraza presents today for   Chief Complaint   Patient presents with    Follow-up     1 month follow up       Is someone accompanying this pt? no    Is the patient using any DME equipment during OV? no    Depression Screening:  3 most recent PHQ Screens 8/5/2022   Little interest or pleasure in doing things Not at all   Feeling down, depressed, irritable, or hopeless Not at all   Total Score PHQ 2 0       Learning Assessment:  Learning Assessment 11/20/2020   PRIMARY LEARNER Patient   HIGHEST LEVEL OF EDUCATION - PRIMARY LEARNER  DID NOT GRADUATE HIGH SCHOOL   BARRIERS PRIMARY LEARNER NONE   CO-LEARNER CAREGIVER No   PRIMARY LANGUAGE ENGLISH   LEARNER PREFERENCE PRIMARY DEMONSTRATION   ANSWERED BY patient   RELATIONSHIP SELF       Fall Risk  Fall Risk Assessment, last 12 mths 2/19/2021   Able to walk? Yes   Fall in past 12 months? 0   Do you feel unsteady? 0   Are you worried about falling 0       ADL  ADL Assessment 8/5/2022   Feeding yourself No Help Needed   Getting from bed to chair No Help Needed   Getting dressed No Help Needed   Bathing or showering No Help Needed   Walk across the room (includes cane/walker) No Help Needed   Using the telphone No Help Needed   Taking your medications No Help Needed   Preparing meals No Help Needed   Managing money (expenses/bills) No Help Needed   Moderately strenuous housework (laundry) No Help Needed   Shopping for personal items (toiletries/medicines) No Help Needed   Shopping for groceries No Help Needed   Driving No Help Needed   Climbing a flight of stairs No Help Needed   Getting to places beyond walking distances No Help Needed       Health Maintenance reviewed and discussed and ordered per Provider. Health Maintenance Due   Topic Date Due    Shingrix Vaccine Age 49> (1 of 2) Never done    Low dose CT lung screening  Never done    Colorectal Cancer Screening Combo  Never done    COVID-19 Vaccine (3 - Booster for Moderna series) 02/22/2022   . Coordination of Care:  1. \"Have you been to the ER, urgent care clinic since your last visit? Hospitalized since your last visit? \" No    2. \"Have you seen or consulted any other health care providers outside of the [address] [place] [address] since your last visit? \" No     3. For patients aged 39-70: Has the patient had a colonoscopy? No   PATIENT HAS FIT TEST    If the patient is female:    4. For patients aged 41-77: Has the patient had a mammogram within the past 2 years? No    5. For patients aged 21-65: Has the patient had a pap smear?  NA - based on age

## 2022-08-05 NOTE — PROGRESS NOTES
Gregory Barnard (: 1960) is a 57 y.o. female here for evaluation of the following chief concern(s):  Follow-up; Chronic condition management    ASSESSMENT/PLAN:  1. Polyarthropathy  Refer to Rheumatology for evaluation/managament of chronic polyarthropathy, 2022 EVELYN negative, hx of Lyme exposure (IgG) labs. -     REFERRAL TO RHEUMATOLOGY    2. Paresthesia  Unclear etiology, possibly related to vitamin deficiency; pt would like to take supplement, see Rheumatology and continue monitoring at this time. We discussed possibly referral to Neurology- pt would defers until next visit. 3. B12 deficiency  Pt advised can but this B12 supplement OTC. B complex sent to the pharmacy. 3. History of Lyme disease  We discussed that we can also consider referral to Infectious Disease regarding hx of Lyme exposure. -     REFERRAL TO RHEUMATOLOGY    4. Encounter for screening mammogram for malignant neoplasm of breast  -     SENTHIL MAMMO BI SCREENING INCL CAD; Future      Return in about 3 months (around 2022) for follow-up chronic conditions. Gregory Barnard agrees with plan as above and has no additional questions at this time. SUBJECTIVE/OBJECTIVE:  Overall, pt is feeling \"OK\". Chronic RUE Paresthesias:  Onset end of - unclear cause, pt felt this could be related to recent back surgery 2021- she states her surgeon has reassured her this is not the etiology. No focal weakness, fecal incontinence. HTN:  Home BP monitoring: Checks at work- well controlled. Polyarthropathy:  Knees, ankles, wrists. Chronic NSAID use, but not every day. 2022 EVELYN negative. 2022 B12 245. 2022 POSITIVE LYME IgG; Hx multiple tick bites in the past, previously she developed a rash and notified her MD but was not treated w/ abx because she did not have a fever.     Hypothyroidism:  2021 1.6  She takes Levothyroxine 100mcg QAM (she takes this at the same time of PPI).    CAD:  Years ago required stress test, per pt hx of silent MI and it caused damage. Pt states she was placed on ASA 325mg but now takes 81mg daily, no statin therapy. August 2022 10-yr ASCVD risk: 3.8%  7/2022 ECHO:   Left Ventricle: Normal left ventricular systolic function. EF by 2D Simpsons Biplane is 55%. Left ventricle is mildly dilated. Mildly increased wall thickness. Findings consistent with mild concentric hypertrophy. Normal wall motion. Normal diastolic function. Aortic Valve: Tricuspid valve. Mildly thickened cusp. Tricuspid Valve: Normal RVSP. The estimated RVSP is 29 mmHg. Left Atrium: Left atrium is mildly dilated. Left atrial volume index is mildly increased (35-41 mL/m2). Meds: See list below; full adherence, no side effects. Diet: \"I eat bad\"- she does not do a lot of cooking since her  passed away. Exercise: Active with work. Renal indices: Up to date/overdue  Dilated eye exam: Up to date/overdue    ROS: No CP, palp, SOB, abd pain, headache, dizziness, falls. +FLOR.  +Edema, chronic; follows w/ Dr. Mellissa Pacheco, Vascular Surgery. Past Medical History:   Diagnosis Date    GERD (gastroesophageal reflux disease)     Hypothyroidism     Nausea & vomiting      Past Surgical History:   Procedure Laterality Date    HX CATARACT REMOVAL      HX HYSTERECTOMY      HX ORTHOPAEDIC Left     carpal tunnel wrist    HX PARTIAL HYSTERECTOMY      VASCULAR SURGERY PROCEDURE UNLIST Bilateral     varicose veins      History reviewed. No pertinent family history.     Social History     Socioeconomic History    Marital status:      Spouse name: Not on file    Number of children: Not on file    Years of education: Not on file    Highest education level: Not on file   Occupational History    Not on file   Tobacco Use    Smoking status: Former     Packs/day: 1.00     Years: 20.00     Pack years: 20.00     Types: Cigarettes     Start date: 0     Quit date: 2/18/2020     Years since quittin.4    Smokeless tobacco: Never    Tobacco comments:     patient doesn't smoke   Vaping Use    Vaping Use: Never used   Substance and Sexual Activity    Alcohol use: Yes     Comment: rare    Drug use: Never    Sexual activity: Not on file   Other Topics Concern    Not on file   Social History Narrative    Not on file     Social Determinants of Health     Financial Resource Strain: Not on file   Food Insecurity: Not on file   Transportation Needs: Not on file   Physical Activity: Not on file   Stress: Not on file   Social Connections: Not on file   Intimate Partner Violence: Not on file   Housing Stability: Not on file     Social History     Tobacco Use   Smoking Status Former    Packs/day: 1.00    Years: 20.00    Pack years: 20.00    Types: Cigarettes    Start date: 0    Quit date: 2020    Years since quittin.4   Smokeless Tobacco Never   Tobacco Comments    patient doesn't smoke       Current Outpatient Medications   Medication Sig Dispense Refill    pantoprazole (PROTONIX) 40 mg tablet Take 1 Tablet by mouth daily. 90 Tablet 3    aspirin delayed-release 81 mg tablet Take 1 Tablet by mouth daily. 90 Tablet 1    Euthyrox 100 mcg tablet Take 1 tablet by mouth once daily 90 Tablet 3    ibuprofen (MOTRIN) 800 mg tablet Take 1 Tablet by mouth three (3) times daily as needed. Allergies   Allergen Reactions    Sulfa (Sulfonamide Antibiotics) Shortness of Breath       /70 (BP 1 Location: Left upper arm, BP Patient Position: Sitting, BP Cuff Size: Adult long)   Pulse (!) 59   Temp 97.1 °F (36.2 °C) (Temporal)   Resp 20   Ht 5' 4\" (1.626 m)   Wt 254 lb (115.2 kg)   SpO2 97%   BMI 43.60 kg/m²     Physical Exam  Vitals reviewed. Constitutional:       Appearance: Normal appearance. She is not ill-appearing. Cardiovascular:      Rate and Rhythm: Normal rate and regular rhythm. Heart sounds: Normal heart sounds.    Pulmonary:      Effort: Pulmonary effort is normal. Breath sounds: Normal breath sounds. Abdominal:      Palpations: Abdomen is soft. Tenderness: There is no abdominal tenderness. Skin:     General: Skin is warm and dry. Neurological:      Mental Status: She is alert. Mental status is at baseline. Lab Results   Component Value Date/Time    WBC 4.7 07/01/2022 09:34 AM    HGB 12.7 07/01/2022 09:34 AM    HCT 38.3 07/01/2022 09:34 AM    PLATELET 947 67/07/8164 09:34 AM    MCV 83 07/01/2022 09:34 AM     Lab Results   Component Value Date/Time    Sodium 141 07/01/2022 09:34 AM    Potassium 4.4 07/01/2022 09:34 AM    Chloride 106 07/01/2022 09:34 AM    CO2 22 07/01/2022 09:34 AM    Anion gap 5 03/24/2021 08:39 AM    Glucose 108 (H) 07/01/2022 09:34 AM    BUN 18 07/01/2022 09:34 AM    Creatinine 1.01 (H) 07/01/2022 09:34 AM    BUN/Creatinine ratio 18 07/01/2022 09:34 AM    GFR est AA >60 03/24/2021 08:39 AM    GFR est non-AA 51 (L) 03/24/2021 08:39 AM    Calcium 9.7 07/01/2022 09:34 AM    Bilirubin, total 0.6 07/01/2022 09:34 AM    Alk. phosphatase 112 07/01/2022 09:34 AM    Protein, total 7.2 07/01/2022 09:34 AM    Albumin 4.5 07/01/2022 09:34 AM    A-G Ratio 1.7 07/01/2022 09:34 AM    ALT (SGPT) 23 07/01/2022 09:34 AM    AST (SGOT) 34 07/01/2022 09:34 AM     Lab Results   Component Value Date/Time    Cholesterol, total 181 07/01/2022 09:34 AM    HDL Cholesterol 64 07/01/2022 09:34 AM    LDL, calculated 104 (H) 07/01/2022 09:34 AM    VLDL, calculated 13 07/01/2022 09:34 AM    Triglyceride 68 07/01/2022 09:34 AM       On this date 08/05/22 I have spent >30 minutes reviewing previous notes, test results and face to face with the patient for interview/exam, discussing working diagnosis and treatment plan, and AVS written same day as her visit. Medical decision making complexity: moderate-high.     Lul Brothers MD   Family & Geriatric Medicine

## 2022-08-07 RX ORDER — MULTIVIT WITH MINERALS/HERBS
1 TABLET ORAL DAILY
Qty: 90 TABLET | Refills: 1 | Status: SHIPPED | OUTPATIENT
Start: 2022-08-07

## 2022-11-08 ENCOUNTER — OFFICE VISIT (OUTPATIENT)
Dept: INTERNAL MEDICINE CLINIC | Age: 62
End: 2022-11-08
Payer: COMMERCIAL

## 2022-11-08 VITALS
DIASTOLIC BLOOD PRESSURE: 80 MMHG | HEIGHT: 64 IN | RESPIRATION RATE: 20 BRPM | HEART RATE: 57 BPM | BODY MASS INDEX: 43.36 KG/M2 | SYSTOLIC BLOOD PRESSURE: 132 MMHG | WEIGHT: 254 LBS | TEMPERATURE: 97.5 F | OXYGEN SATURATION: 97 %

## 2022-11-08 DIAGNOSIS — E53.8 B12 DEFICIENCY: ICD-10-CM

## 2022-11-08 DIAGNOSIS — Z12.11 SCREEN FOR COLON CANCER: ICD-10-CM

## 2022-11-08 DIAGNOSIS — I25.10 CORONARY ARTERY DISEASE INVOLVING NATIVE CORONARY ARTERY OF NATIVE HEART WITHOUT ANGINA PECTORIS: ICD-10-CM

## 2022-11-08 DIAGNOSIS — E03.9 ACQUIRED HYPOTHYROIDISM: ICD-10-CM

## 2022-11-08 DIAGNOSIS — I10 ESSENTIAL HYPERTENSION: ICD-10-CM

## 2022-11-08 DIAGNOSIS — M13.0 POLYARTHROPATHY: Primary | ICD-10-CM

## 2022-11-08 DIAGNOSIS — R20.2 PARESTHESIA: ICD-10-CM

## 2022-11-08 PROCEDURE — 3078F DIAST BP <80 MM HG: CPT | Performed by: FAMILY MEDICINE

## 2022-11-08 PROCEDURE — 3074F SYST BP LT 130 MM HG: CPT | Performed by: FAMILY MEDICINE

## 2022-11-08 PROCEDURE — 99214 OFFICE O/P EST MOD 30 MIN: CPT | Performed by: FAMILY MEDICINE

## 2022-11-08 RX ORDER — LEVOTHYROXINE SODIUM 100 UG/1
100 TABLET ORAL DAILY
Qty: 90 TABLET | Refills: 2 | Status: SHIPPED | OUTPATIENT
Start: 2022-11-08

## 2022-11-08 RX ORDER — METHOCARBAMOL 500 MG/1
TABLET, FILM COATED ORAL
COMMUNITY
Start: 2022-09-06

## 2022-11-08 NOTE — PROGRESS NOTES
Javier Coppola presents today for   Chief Complaint   Patient presents with    Follow Up Chronic Condition     3 month follow up       Is someone accompanying this pt?no    Is the patient using any DME equipment during OV? no    Depression Screening:  3 most recent PHQ Screens 11/8/2022   Little interest or pleasure in doing things Not at all   Feeling down, depressed, irritable, or hopeless Not at all   Total Score PHQ 2 0       Learning Assessment:  Learning Assessment 11/20/2020   PRIMARY LEARNER Patient   HIGHEST LEVEL OF EDUCATION - PRIMARY LEARNER  DID NOT GRADUATE HIGH SCHOOL   BARRIERS PRIMARY LEARNER NONE   CO-LEARNER CAREGIVER No   PRIMARY LANGUAGE ENGLISH   LEARNER PREFERENCE PRIMARY DEMONSTRATION   ANSWERED BY patient   RELATIONSHIP SELF       Fall Risk  Fall Risk Assessment, last 12 mths 2/19/2021   Able to walk? Yes   Fall in past 12 months? 0   Do you feel unsteady? 0   Are you worried about falling 0       ADL  ADL Assessment 11/8/2022   Feeding yourself No Help Needed   Getting from bed to chair No Help Needed   Getting dressed No Help Needed   Bathing or showering No Help Needed   Walk across the room (includes cane/walker) No Help Needed   Using the telphone No Help Needed   Taking your medications No Help Needed   Preparing meals No Help Needed   Managing money (expenses/bills) No Help Needed   Moderately strenuous housework (laundry) No Help Needed   Shopping for personal items (toiletries/medicines) No Help Needed   Shopping for groceries No Help Needed   Driving No Help Needed   Climbing a flight of stairs No Help Needed   Getting to places beyond walking distances No Help Needed       Health Maintenance reviewed and discussed and ordered per Provider.     Health Maintenance Due   Topic Date Due    Shingrix Vaccine Age 49> (1 of 2) Never done    Low dose CT lung screening  Never done    Colorectal Cancer Screening Combo  Never done    COVID-19 Vaccine (3 - Booster for Moderna series) 11/17/2021    Flu Vaccine (1) Never done   . Coordination of Care:  1. \"Have you been to the ER, urgent care clinic since your last visit? Hospitalized since your last visit? \" No    2. \"Have you seen or consulted any other health care providers outside of the [address] [place] [address] since your last visit? \" No     3. For patients aged 39-70: Has the patient had a colonoscopy? No     If the patient is female:    4. For patients aged 41-77: Has the patient had a mammogram within the past 2 years? No  Encouraged to have mammogram done, states can't afford it at this time. 5. For patients aged 21-65: Has the patient had a pap smear?  NA - based on age

## 2023-08-11 RX ORDER — PANTOPRAZOLE SODIUM 40 MG/1
TABLET, DELAYED RELEASE ORAL
Qty: 90 TABLET | Refills: 0 | OUTPATIENT
Start: 2023-08-11

## 2023-08-11 RX ORDER — LEVOTHYROXINE SODIUM 0.1 MG/1
TABLET ORAL
Qty: 90 TABLET | Refills: 0 | OUTPATIENT
Start: 2023-08-11

## 2023-08-28 ENCOUNTER — OFFICE VISIT (OUTPATIENT)
Facility: CLINIC | Age: 63
End: 2023-08-28
Payer: COMMERCIAL

## 2023-08-28 VITALS
HEIGHT: 64 IN | SYSTOLIC BLOOD PRESSURE: 138 MMHG | HEART RATE: 56 BPM | DIASTOLIC BLOOD PRESSURE: 74 MMHG | RESPIRATION RATE: 16 BRPM | TEMPERATURE: 97.9 F | WEIGHT: 242 LBS | BODY MASS INDEX: 41.32 KG/M2 | OXYGEN SATURATION: 93 %

## 2023-08-28 DIAGNOSIS — E03.9 HYPOTHYROIDISM, UNSPECIFIED TYPE: Primary | ICD-10-CM

## 2023-08-28 DIAGNOSIS — K21.9 GASTROESOPHAGEAL REFLUX DISEASE WITHOUT ESOPHAGITIS: ICD-10-CM

## 2023-08-28 DIAGNOSIS — E53.8 B12 DEFICIENCY: ICD-10-CM

## 2023-08-28 DIAGNOSIS — I25.10 ATHEROSCLEROSIS OF NATIVE CORONARY ARTERY OF NATIVE HEART WITHOUT ANGINA PECTORIS: ICD-10-CM

## 2023-08-28 PROBLEM — I10 ESSENTIAL HYPERTENSION: Status: RESOLVED | Noted: 2020-11-20 | Resolved: 2023-08-28

## 2023-08-28 PROCEDURE — 1123F ACP DISCUSS/DSCN MKR DOCD: CPT | Performed by: FAMILY MEDICINE

## 2023-08-28 PROCEDURE — 99214 OFFICE O/P EST MOD 30 MIN: CPT | Performed by: FAMILY MEDICINE

## 2023-08-28 RX ORDER — PANTOPRAZOLE SODIUM 40 MG/1
40 TABLET, DELAYED RELEASE ORAL DAILY
Qty: 90 TABLET | Refills: 4 | Status: SHIPPED | OUTPATIENT
Start: 2023-08-28

## 2023-08-28 RX ORDER — LEVOTHYROXINE SODIUM 0.1 MG/1
100 TABLET ORAL DAILY
Qty: 90 TABLET | Refills: 0 | Status: SHIPPED | OUTPATIENT
Start: 2023-08-28

## 2023-08-28 SDOH — ECONOMIC STABILITY: FOOD INSECURITY: WITHIN THE PAST 12 MONTHS, THE FOOD YOU BOUGHT JUST DIDN'T LAST AND YOU DIDN'T HAVE MONEY TO GET MORE.: PATIENT DECLINED

## 2023-08-28 SDOH — ECONOMIC STABILITY: HOUSING INSECURITY
IN THE LAST 12 MONTHS, WAS THERE A TIME WHEN YOU DID NOT HAVE A STEADY PLACE TO SLEEP OR SLEPT IN A SHELTER (INCLUDING NOW)?: PATIENT REFUSED

## 2023-08-28 SDOH — ECONOMIC STABILITY: INCOME INSECURITY: HOW HARD IS IT FOR YOU TO PAY FOR THE VERY BASICS LIKE FOOD, HOUSING, MEDICAL CARE, AND HEATING?: SOMEWHAT HARD

## 2023-08-28 SDOH — ECONOMIC STABILITY: FOOD INSECURITY: WITHIN THE PAST 12 MONTHS, YOU WORRIED THAT YOUR FOOD WOULD RUN OUT BEFORE YOU GOT MONEY TO BUY MORE.: PATIENT DECLINED

## 2023-08-28 ASSESSMENT — PATIENT HEALTH QUESTIONNAIRE - PHQ9
SUM OF ALL RESPONSES TO PHQ QUESTIONS 1-9: 0
SUM OF ALL RESPONSES TO PHQ QUESTIONS 1-9: 0
2. FEELING DOWN, DEPRESSED OR HOPELESS: 0
SUM OF ALL RESPONSES TO PHQ9 QUESTIONS 1 & 2: 0
SUM OF ALL RESPONSES TO PHQ QUESTIONS 1-9: 0
SUM OF ALL RESPONSES TO PHQ QUESTIONS 1-9: 0
1. LITTLE INTEREST OR PLEASURE IN DOING THINGS: 0

## 2023-08-28 NOTE — PROGRESS NOTES
Uma Harris (: 1960) is a 70 y.o. female here for evaluation of the following chief concern(s):  Chronic condition management       ASSESSMENT/PLAN:  1. Hypothyroidism, unspecified type  -     levothyroxine (SYNTHROID) 100 MCG tablet; Take 1 tablet by mouth daily, Disp-90 tablet, R-0Normal  -     TSH; Future  2. Gastroesophageal reflux disease without esophagitis  -     pantoprazole (PROTONIX) 40 MG tablet; Take 1 tablet by mouth daily, Disp-90 tablet, R-4Normal  3. Atherosclerosis of native coronary artery of native heart without angina pectoris  -     Lipid Panel; Future  4. B12 deficiency  -     Vitamin B12; Future  5. BMI 40.0-44.9, adult (HCC)  -     TSH; Future  -     Comprehensive Metabolic Panel; Future  -     Lipid Panel; Future    Ms. Kolby Boggs appears medically stable, normal hemodynamics including blood pressure, baseline mild low range HR unchanged. Refill current dose of Levothyroxine and plan to update TSH in ~8 weeks, of note pt will be trying to reduce dose of PPI so there could be more bioavailable medication when interpreting result next visit. Pt to try for PPI every other day if tolerated. CAD asymptomatic, pt adherent w/ antiplatelet therapy. Hx referred to Rheumatology for evaluation/managament of chronic polyarthropathy and paresthesias; she defers consultation at this time. Pt is taking B12 supplement and I advised starting empiric vitamin D supplement. Next visit, consider review of health maintenance; mammogram and iFOBT. Return in about 8 weeks (around 10/23/2023) for follow-up chronic conditions or sooner if needed, labs 2-3 days prior to appointment. Uma Harris agrees with plan as above and has no additional questions at this time. SUBJECTIVE/OBJECTIVE:  Overall, pt is feeling \"not so great the past month\". No acute concerns: Pt has been out of medications for 1 month, ?miscommunication w/ pharmacy.   Otherwise no significant

## 2023-08-28 NOTE — PROGRESS NOTES
Graciela Newton presents today for   Chief Complaint   Patient presents with    Medication Refill       Coordination of Care:    1. \"Have you been to the ER, urgent care clinic since your last visit? Hospitalized since your last visit? \" No    2. \"Have you seen or consulted any other health care providers outside of the [address] [place] [address] since your last visit? \" No     3. For patients aged 43-73: Has the patient had a colonoscopy / FIT/ Cologuard? No      If the patient is female:    4. For patients aged 43-66: Has the patient had a mammogram within the past 2 years? No      5. For patients aged 21-65: Has the patient had a pap smear?  No

## 2023-09-21 ENCOUNTER — APPOINTMENT (OUTPATIENT)
Age: 63
End: 2023-09-21
Payer: COMMERCIAL

## 2023-09-21 ENCOUNTER — HOSPITAL ENCOUNTER (EMERGENCY)
Age: 63
Discharge: HOME | End: 2023-09-21
Attending: INTERNAL MEDICINE
Payer: COMMERCIAL

## 2023-09-21 VITALS
BODY MASS INDEX: 42.32 KG/M2 | WEIGHT: 254 LBS | OXYGEN SATURATION: 98 % | SYSTOLIC BLOOD PRESSURE: 165 MMHG | TEMPERATURE: 98.4 F | HEART RATE: 59 BPM | HEIGHT: 65 IN | RESPIRATION RATE: 16 BRPM | DIASTOLIC BLOOD PRESSURE: 89 MMHG

## 2023-09-21 DIAGNOSIS — S82.045A CLOSED NONDISPLACED COMMINUTED FRACTURE OF LEFT PATELLA, INITIAL ENCOUNTER: Primary | ICD-10-CM

## 2023-09-21 PROCEDURE — 73562 X-RAY EXAM OF KNEE 3: CPT

## 2023-09-21 PROCEDURE — 73700 CT LOWER EXTREMITY W/O DYE: CPT

## 2023-09-21 PROCEDURE — 99284 EMERGENCY DEPT VISIT MOD MDM: CPT

## 2023-09-21 RX ORDER — HYDROCODONE BITARTRATE AND ACETAMINOPHEN 5; 325 MG/1; MG/1
1 TABLET ORAL EVERY 6 HOURS PRN
Qty: 12 TABLET | Refills: 0 | Status: SHIPPED | OUTPATIENT
Start: 2023-09-21 | End: 2023-09-24

## 2023-09-21 ASSESSMENT — LIFESTYLE VARIABLES
HOW OFTEN DO YOU HAVE A DRINK CONTAINING ALCOHOL: NEVER
HOW MANY STANDARD DRINKS CONTAINING ALCOHOL DO YOU HAVE ON A TYPICAL DAY: PATIENT DOES NOT DRINK

## 2023-09-21 ASSESSMENT — PAIN - FUNCTIONAL ASSESSMENT: PAIN_FUNCTIONAL_ASSESSMENT: NONE - DENIES PAIN

## 2023-09-21 NOTE — ED NOTES
Pt. Lazarus Rice in knee immobilizer at this time. Crutch teaching performed.       Davi Hunt RN  09/21/23 1936

## 2023-09-21 NOTE — ED NOTES
I have reviewed discharge instructions with the patient. The patient verbalized understanding.       Althea Britton RN  09/21/23 1939

## 2023-09-21 NOTE — ED PROVIDER NOTES
(SYNTHROID) 100 MCG tablet Take 1 tablet by mouth daily, Disp-90 tablet, R-0Normal      pantoprazole (PROTONIX) 40 MG tablet Take 1 tablet by mouth daily, Disp-90 tablet, R-4Normal      aspirin 81 MG EC tablet Take 1 tablet by mouth dailyHistorical Med             ALLERGIES     Sulfa antibiotics    FAMILY HISTORY     History reviewed. No pertinent family history. SOCIAL HISTORY       Social History     Socioeconomic History    Marital status:       Spouse name: None    Number of children: None    Years of education: None    Highest education level: None   Tobacco Use    Smoking status: Former     Packs/day: 1     Types: Cigarettes     Start date: 1/1/1981     Quit date: 2/18/2020     Years since quitting: 3.5    Smokeless tobacco: Never    Tobacco comments:     Quit smoking: patient doesn't smoke   Substance and Sexual Activity    Alcohol use: Yes     Comment: holidays    Drug use: Never     Social Determinants of Health     Financial Resource Strain: Medium Risk (8/28/2023)    Overall Financial Resource Strain (CARDIA)     Difficulty of Paying Living Expenses: Somewhat hard   Food Insecurity: Unknown (8/28/2023)    Hunger Vital Sign     Worried About Running Out of Food in the Last Year: Patient refused     [address] in the Last Year: Patient refused   Transportation Needs: Unknown (8/28/2023)    PRAPARE - Transportation     Lack of Transportation (Non-Medical): Patient refused   Housing Stability: Unknown (8/28/2023)    Housing Stability Vital Sign     Unstable Housing in the Last Year: Patient refused       SCREENINGS         Jose R Coma Scale  Eye Opening: Spontaneous  Best Verbal Response: Oriented  Best Motor Response: Obeys commands  [place] Coma Scale Score: 15                     CIWA Assessment  BP: (!) 165/89  Pulse: 59                 PHYSICAL EXAM    (up to 7 for level 4, 8 or more for level 5)     ED Triage Vitals [09/21/23 1220]   BP Temp Temp Source Pulse Respirations SpO2 Height

## 2023-09-22 ENCOUNTER — OFFICE VISIT (OUTPATIENT)
Age: 63
End: 2023-09-22
Payer: COMMERCIAL

## 2023-09-22 VITALS — HEIGHT: 65 IN | BODY MASS INDEX: 42.32 KG/M2 | WEIGHT: 254 LBS

## 2023-09-22 DIAGNOSIS — S82.035A CLOSED NONDISPLACED TRANSVERSE FRACTURE OF LEFT PATELLA, INITIAL ENCOUNTER: Primary | ICD-10-CM

## 2023-09-22 DIAGNOSIS — M25.562 LEFT KNEE PAIN, UNSPECIFIED CHRONICITY: ICD-10-CM

## 2023-09-22 PROBLEM — R22.9 LOCALIZED SWELLING, MASS AND LUMP, UNSPECIFIED: Status: ACTIVE | Noted: 2023-09-22

## 2023-09-22 PROCEDURE — 99203 OFFICE O/P NEW LOW 30 MIN: CPT | Performed by: ORTHOPAEDIC SURGERY

## 2023-09-22 PROCEDURE — 27520 TREAT KNEECAP FRACTURE: CPT | Performed by: ORTHOPAEDIC SURGERY

## 2023-09-22 PROCEDURE — 1123F ACP DISCUSS/DSCN MKR DOCD: CPT | Performed by: ORTHOPAEDIC SURGERY

## 2023-10-19 ENCOUNTER — OFFICE VISIT (OUTPATIENT)
Age: 63
End: 2023-10-19

## 2023-10-19 DIAGNOSIS — M25.562 LEFT KNEE PAIN, UNSPECIFIED CHRONICITY: Primary | ICD-10-CM

## 2023-10-19 NOTE — PROGRESS NOTES
Name: Meaghan Chavez    : 1960     [id number] [facility],[address] [facility] SP[facility] MEDICINE  [address] [place] [address], [address] [place] [address] [address] [id number]  Dept: [phone]  Dept Fax: [phone]     Chief Complaint   Patient presents with    Post-Op Check    Knee Pain        There were no vitals taken for this visit. Allergies   Allergen Reactions    Sulfa Antibiotics Shortness Of Breath     Other reaction(s): wheezing/sob        Current Outpatient Medications   Medication Sig Dispense Refill    levothyroxine (SYNTHROID) 100 MCG tablet Take 1 tablet by mouth daily 90 tablet 0    pantoprazole (PROTONIX) 40 MG tablet Take 1 tablet by mouth daily 90 tablet 4     No current facility-administered medications for this visit. Patient Active Problem List   Diagnosis    GERD (gastroesophageal reflux disease)    Hypothyroidism    Status post lumbar laminectomy    Back pain    Localized swelling, mass and lump, unspecified      History reviewed. No pertinent family history. Past Surgical History:   Procedure Laterality Date    CATARACT REMOVAL      CERVICAL FUSION      HYSTERECTOMY (CERVIX STATUS UNKNOWN)      ORTHOPEDIC SURGERY Left     carpal tunnel wrist    PARTIAL HYSTERECTOMY (CERVIX NOT REMOVED)      VASCULAR SURGERY Bilateral     varicose veins       Past Medical History:   Diagnosis Date    CAD (coronary artery disease)     GERD (gastroesophageal reflux disease)     Hypothyroidism     Nausea & vomiting         I have reviewed and agree with PFSH and ROS and intake form in chart and the record furthermore I have reviewed prior medical record(s) regarding this patients care during this appointment.      Review of Systems:   Patient is a pleasant appearing individual, appropriately dressed, well hydrated, well nourished, who is alert, appropriately oriented for age, and in no acute distress with a normal gait and normal affect who does not appear to be in any significant

## 2023-11-03 ENCOUNTER — TELEPHONE (OUTPATIENT)
Age: 63
End: 2023-11-03

## 2023-11-03 NOTE — TELEPHONE ENCOUNTER
Rbeecca Hunt MD  Patient had a left patella fx and was originally seen on 9/22/23. Her last visit was 10/19/23. Her last visit we stated she could go back to work with no restrictions as of 11/6/23, but she expresses concern stating she is still having a lot of pain and does not think she can go back to work yet. Would you like an appointment or can her out of work note be extended? \"    HPI:   The patient is here with a chief complaint of left knee pain, diagnosed with patellar fracture, will be treated conservatively. X-rays of the left knee, AP, lateral and oblique of the left knee are positive for well-healed fracture of the patella. Assessment/Plan:   Plan at this point, ice, elevate, antiinflammatories, activities as tolerated started, no restrictions. We will see the patient back as needed. [facility] in the meantime. She is going back with no restrictions on 11/06/2023 and go from there. \"     Patients contact: [phone]     \"Staff message sent to Dr. Dash Conway on 11/2/23, waiting for response\"

## 2023-11-13 ENCOUNTER — OFFICE VISIT (OUTPATIENT)
Age: 63
End: 2023-11-13

## 2023-11-13 DIAGNOSIS — M25.562 LEFT KNEE PAIN, UNSPECIFIED CHRONICITY: Primary | ICD-10-CM

## 2023-11-13 NOTE — PROGRESS NOTES
Name: Tiana Mulligan    : 1960     [address] SPORTS MEDICINE  [address] [place] [address], [address] [place] [address] [id number]  Dept: [phone]  Dept Fax: [phone]     No chief complaint on file. There were no vitals taken for this visit. Allergies   Allergen Reactions    Sulfa Antibiotics Shortness Of Breath     Other reaction(s): wheezing/sob        Current Outpatient Medications   Medication Sig Dispense Refill    levothyroxine (SYNTHROID) 100 MCG tablet Take 1 tablet by mouth daily 90 tablet 0    pantoprazole (PROTONIX) 40 MG tablet Take 1 tablet by mouth daily 90 tablet 4     No current facility-administered medications for this visit. Patient Active Problem List   Diagnosis    GERD (gastroesophageal reflux disease)    Hypothyroidism    Status post lumbar laminectomy    Back pain    Localized swelling, mass and lump, unspecified      No family history on file. Past Surgical History:   Procedure Laterality Date    CATARACT REMOVAL      CERVICAL FUSION      HYSTERECTOMY (CERVIX STATUS UNKNOWN)      ORTHOPEDIC SURGERY Left     carpal tunnel wrist    PARTIAL HYSTERECTOMY (CERVIX NOT REMOVED)      VASCULAR SURGERY Bilateral     varicose veins       Past Medical History:   Diagnosis Date    CAD (coronary artery disease)     GERD (gastroesophageal reflux disease)     Hypothyroidism     Nausea & vomiting         I have reviewed and agree with PFSH and ROS and intake form in chart and the record furthermore I have reviewed prior medical record(s) regarding this patients care during this appointment. Review of Systems:   Patient is a pleasant appearing individual, appropriately dressed, well hydrated, well nourished, who is alert, appropriately oriented for age, and in no acute distress with a normal gait and normal affect who does not appear to be in any significant pain.     Physical Exam:  Right Knee -Decrease range of motion

## 2023-11-18 DIAGNOSIS — E03.9 HYPOTHYROIDISM, UNSPECIFIED TYPE: ICD-10-CM

## 2023-11-20 RX ORDER — LEVOTHYROXINE SODIUM 0.1 MG/1
100 TABLET ORAL DAILY
Qty: 90 TABLET | Refills: 0 | Status: SHIPPED | OUTPATIENT
Start: 2023-11-20

## 2024-01-13 ENCOUNTER — HOSPITAL ENCOUNTER (OUTPATIENT)
Age: 64
Discharge: HOME | End: 2024-01-16
Payer: COMMERCIAL

## 2024-01-13 DIAGNOSIS — I25.10 ATHEROSCLEROSIS OF NATIVE CORONARY ARTERY OF NATIVE HEART WITHOUT ANGINA PECTORIS: ICD-10-CM

## 2024-01-13 DIAGNOSIS — E53.8 B12 DEFICIENCY: ICD-10-CM

## 2024-01-13 DIAGNOSIS — E03.9 HYPOTHYROIDISM, UNSPECIFIED TYPE: ICD-10-CM

## 2024-01-13 LAB
ALBUMIN SERPL-MCNC: 3.7 G/DL (ref 3.4–5)
ALBUMIN/GLOB SERPL: 1 (ref 0.8–1.7)
ALP SERPL-CCNC: 101 U/L (ref 45–117)
ALT SERPL-CCNC: 33 U/L (ref 13–56)
ANION GAP SERPL CALC-SCNC: 5 MMOL/L (ref 3–18)
AST SERPL W P-5'-P-CCNC: 32 U/L (ref 10–38)
BILIRUB SERPL-MCNC: 0.6 MG/DL (ref 0.2–1)
BUN SERPL-MCNC: 15 MG/DL (ref 7–18)
BUN/CREAT SERPL: 14 (ref 12–20)
CA-I BLD-MCNC: 9.3 MG/DL (ref 8.5–10.1)
CHLORIDE SERPL-SCNC: 109 MMOL/L (ref 100–111)
CO2 SERPL-SCNC: 29 MMOL/L (ref 21–32)
CREAT SERPL-MCNC: 1.08 MG/DL (ref 0.6–1.3)
GLOBULIN SER CALC-MCNC: 3.8 G/DL (ref 2–4)
GLUCOSE SERPL-MCNC: 115 MG/DL (ref 74–99)
POTASSIUM SERPL-SCNC: 4.7 MMOL/L (ref 3.5–5.5)
PROT SERPL-MCNC: 7.5 G/DL (ref 6.4–8.2)
SODIUM SERPL-SCNC: 143 MMOL/L (ref 136–145)
TSH SERPL DL<=0.05 MIU/L-ACNC: 3.77 UIU/ML (ref 0.36–3.74)

## 2024-01-13 PROCEDURE — 80061 LIPID PANEL: CPT

## 2024-01-13 PROCEDURE — 82607 VITAMIN B-12: CPT

## 2024-01-13 PROCEDURE — 80053 COMPREHEN METABOLIC PANEL: CPT

## 2024-01-13 PROCEDURE — 84443 ASSAY THYROID STIM HORMONE: CPT

## 2024-01-13 PROCEDURE — 36415 COLL VENOUS BLD VENIPUNCTURE: CPT

## 2024-01-14 LAB
CHOLEST SERPL-MCNC: 184 MG/DL
HDLC SERPL-MCNC: 80 MG/DL (ref 40–60)
HDLC SERPL: 2.3 (ref 0–5)
LDLC SERPL CALC-MCNC: 91.6 MG/DL (ref 0–100)
LIPID PANEL: ABNORMAL
TRIGL SERPL-MCNC: 62 MG/DL
VIT B12 SERPL-MCNC: 244 PG/ML (ref 211–911)
VLDLC SERPL CALC-MCNC: 12.4 MG/DL

## 2024-01-15 ENCOUNTER — OFFICE VISIT (OUTPATIENT)
Facility: CLINIC | Age: 64
End: 2024-01-15
Payer: COMMERCIAL

## 2024-01-15 VITALS
OXYGEN SATURATION: 94 % | BODY MASS INDEX: 41.86 KG/M2 | SYSTOLIC BLOOD PRESSURE: 137 MMHG | HEART RATE: 61 BPM | WEIGHT: 245.2 LBS | TEMPERATURE: 98.3 F | HEIGHT: 64 IN | RESPIRATION RATE: 20 BRPM | DIASTOLIC BLOOD PRESSURE: 78 MMHG

## 2024-01-15 DIAGNOSIS — E53.8 B12 DEFICIENCY: ICD-10-CM

## 2024-01-15 DIAGNOSIS — Z12.11 SCREEN FOR COLON CANCER: ICD-10-CM

## 2024-01-15 DIAGNOSIS — E03.9 HYPOTHYROIDISM, UNSPECIFIED TYPE: Primary | ICD-10-CM

## 2024-01-15 DIAGNOSIS — I10 ESSENTIAL (PRIMARY) HYPERTENSION: ICD-10-CM

## 2024-01-15 DIAGNOSIS — Z78.0 POST-MENOPAUSAL: ICD-10-CM

## 2024-01-15 DIAGNOSIS — I25.10 ATHEROSCLEROSIS OF NATIVE CORONARY ARTERY OF NATIVE HEART WITHOUT ANGINA PECTORIS: ICD-10-CM

## 2024-01-15 DIAGNOSIS — K21.9 GASTROESOPHAGEAL REFLUX DISEASE WITHOUT ESOPHAGITIS: ICD-10-CM

## 2024-01-15 DIAGNOSIS — Z12.31 ENCOUNTER FOR SCREENING MAMMOGRAM FOR MALIGNANT NEOPLASM OF BREAST: ICD-10-CM

## 2024-01-15 DIAGNOSIS — H93.8X2 EAR FULLNESS, LEFT: ICD-10-CM

## 2024-01-15 PROCEDURE — 3075F SYST BP GE 130 - 139MM HG: CPT | Performed by: FAMILY MEDICINE

## 2024-01-15 PROCEDURE — 3078F DIAST BP <80 MM HG: CPT | Performed by: FAMILY MEDICINE

## 2024-01-15 PROCEDURE — 99214 OFFICE O/P EST MOD 30 MIN: CPT | Performed by: FAMILY MEDICINE

## 2024-01-15 PROCEDURE — 1123F ACP DISCUSS/DSCN MKR DOCD: CPT | Performed by: FAMILY MEDICINE

## 2024-01-15 ASSESSMENT — PATIENT HEALTH QUESTIONNAIRE - PHQ9
SUM OF ALL RESPONSES TO PHQ QUESTIONS 1-9: 0
1. LITTLE INTEREST OR PLEASURE IN DOING THINGS: 0
2. FEELING DOWN, DEPRESSED OR HOPELESS: 0
SUM OF ALL RESPONSES TO PHQ QUESTIONS 1-9: 0
SUM OF ALL RESPONSES TO PHQ9 QUESTIONS 1 & 2: 0

## 2024-01-15 NOTE — PROGRESS NOTES
Patient would like her left ear checked.    Chief Complaint   Patient presents with    Follow-up     Chronic disease         1. \"Have you been to the ER, urgent care clinic since your last visit?  Hospitalized since your last visit?\" Yes Where: ER for broken knee , Dr Dickson    2. \"Have you seen or consulted any other health care providers outside of the [facility] since your last visit?\" No     3. For patients aged 45-75: Has the patient had a colonoscopy / FIT/ Cologuard? No      If the patient is female:    4. For patients aged 40-74: Has the patient had a mammogram within the past 2 years? No      5. For patients aged 21-65: Has the patient had a pap smear? No

## 2024-01-15 NOTE — PROGRESS NOTES
Meaghan Duran (: 1960) is a 63 y.o. female here for evaluation of the following chief concern(s):  Chronic condition management       ASSESSMENT/PLAN:  1. Hypothyroidism, unspecified type  -     TSH; Future  2. B12 deficiency  -     Vitamin B12; Future  3. Gastroesophageal reflux disease without esophagitis  4. Essential (primary) hypertension  5. Atherosclerosis of native coronary artery of native heart without angina pectoris  6. BMI 40.0-44.9, adult (HCC)  7. Screen for colon cancer  -     Occult Blood Stool Immunoassay; Future  8. Encounter for screening mammogram for malignant neoplasm of breast  -     DIGNA DEISY DIGITAL DIAGNOSTIC BILATERAL; Future  9. Post-menopausal  -     DEXA BONE DENSITY AXIAL SKELETON; Future  10. Ear fullness, left  -     External Referral To ENT    Ms. Duran appears medically stable, normal hemodynamics including blood pressure, baseline mild low range HR unchanged.     CAD asymptomatic, pt declines statin at this time, will continue ASA every other day.      Pt opts to separate levothyroxine from PPI and re-check TSH in ~8 weeks, will follow-up w/ a partner and if TSH remains elevated can consider Levothyroxine dose increase to 112mcg QD (still separate from PPI).      Pt advised to increase B12 supplement to daily.  If this count remains low-normal range, we could consider transition to once monthly IM injections with or without continuing oral supplement.    Pt to try for PPI every other day if tolerated.      Pt can call ENT if ear fullness worsens/new symptoms, it it has not improved in another ~1 week.  Hx of recurrent OM as a child.       Pt opts for health maintenance; mammogram and iFOBT; these can be reviewed at the time of her next visit.    Hx referred to Rheumatology for evaluation/managament of chronic polyarthropathy and paresthesias; she deferred consultation at this time.      Return in about 8 weeks (around 3/11/2024) for follow-up w/ NP Gregory for thyroid,

## 2024-02-27 ENCOUNTER — TELEPHONE (OUTPATIENT)
Facility: CLINIC | Age: 64
End: 2024-02-27

## 2024-02-27 DIAGNOSIS — E03.9 HYPOTHYROIDISM, UNSPECIFIED TYPE: ICD-10-CM

## 2024-02-27 RX ORDER — LEVOTHYROXINE SODIUM 0.1 MG/1
100 TABLET ORAL DAILY
Qty: 30 TABLET | Refills: 0 | Status: SHIPPED | OUTPATIENT
Start: 2024-02-27

## 2024-02-27 NOTE — TELEPHONE ENCOUNTER
Patient needs to have her TSH drawn, last visit PCP was considering increase to 112 mcg. Called patient to remind to have labs drawn asap. Only sending 30 day supply until labs resulted.

## 2024-02-27 NOTE — TELEPHONE ENCOUNTER
Spoke to patient and notified her 30 day supply sent and once she gets TSH done and follow up in March then we will see if still needs 100 or increase to 112 mcg. States understanding.

## 2024-02-27 NOTE — TELEPHONE ENCOUNTER
Pt called and said you called her about a refill. I advised you were on the phone but would return her call.,

## 2024-03-06 ENCOUNTER — HOSPITAL ENCOUNTER (OUTPATIENT)
Age: 64
Discharge: HOME | End: 2024-03-09
Payer: COMMERCIAL

## 2024-03-06 DIAGNOSIS — E53.8 B12 DEFICIENCY: ICD-10-CM

## 2024-03-06 DIAGNOSIS — E03.9 HYPOTHYROIDISM, UNSPECIFIED TYPE: ICD-10-CM

## 2024-03-06 LAB
TSH SERPL DL<=0.05 MIU/L-ACNC: 2.45 UIU/ML (ref 0.36–3.74)
VIT B12 SERPL-MCNC: 258 PG/ML (ref 211–911)

## 2024-03-06 PROCEDURE — 82607 VITAMIN B-12: CPT

## 2024-03-06 PROCEDURE — 36415 COLL VENOUS BLD VENIPUNCTURE: CPT

## 2024-03-06 PROCEDURE — 84443 ASSAY THYROID STIM HORMONE: CPT

## 2024-03-11 ENCOUNTER — OFFICE VISIT (OUTPATIENT)
Dept: FAMILY MEDICINE CLINIC | Facility: CLINIC | Age: 64
End: 2024-03-11
Payer: COMMERCIAL

## 2024-03-11 VITALS
HEART RATE: 66 BPM | WEIGHT: 247.6 LBS | SYSTOLIC BLOOD PRESSURE: 129 MMHG | HEIGHT: 64 IN | DIASTOLIC BLOOD PRESSURE: 82 MMHG | OXYGEN SATURATION: 95 % | TEMPERATURE: 98 F | BODY MASS INDEX: 42.27 KG/M2

## 2024-03-11 DIAGNOSIS — E53.8 B12 DEFICIENCY: ICD-10-CM

## 2024-03-11 DIAGNOSIS — E03.9 HYPOTHYROIDISM, UNSPECIFIED TYPE: Primary | ICD-10-CM

## 2024-03-11 PROCEDURE — 99213 OFFICE O/P EST LOW 20 MIN: CPT

## 2024-03-11 PROCEDURE — 1123F ACP DISCUSS/DSCN MKR DOCD: CPT

## 2024-03-11 ASSESSMENT — ENCOUNTER SYMPTOMS: RESPIRATORY NEGATIVE: 1

## 2024-03-11 NOTE — PROGRESS NOTES
Meaghan GRIFFIN Roger presents today for   Chief Complaint   Patient presents with    Follow-up     Labs        Is someone accompanying this pt? No     Is the patient using any DME equipment during OV? No     Depression Screenin/15/2024    10:07 AM 2023    11:33 AM 2022     8:20 AM 2022     8:22 AM 2022     8:53 AM   PHQ-9 Questionaire   Little interest or pleasure in doing things 0 0 0 0 0   Feeling down, depressed, or hopeless 0 0 0 0 0   PHQ-9 Total Score 0 0 0 0 0       Fall Risk      2023    11:33 AM   Fall Risk   2 or more falls in past year? no   Fall with injury in past year? no        Health Maintenance reviewed and discussed and ordered per Provider.    Health Maintenance Due   Topic Date Due    HIV screen  Never done    Colorectal Cancer Screen  Never done    Shingles vaccine (1 of 2) Never done    Low dose CT lung screening &/or counseling  Never done    DEXA (modify frequency per FRAX score)  Never done    Respiratory Syncytial Virus (RSV) Pregnant or age 60 yrs+ (1 - 1-dose 60+ series) Never done    Breast cancer screen  2023    Pneumococcal 65+ years Vaccine (1 - PCV) Never done    Flu vaccine (1) Never done    COVID-19 Vaccine (3 -  season) 2023   .        \"Have you been to the ER, urgent care clinic since your last visit?  Hospitalized since your last visit?\"    NO    “Have you seen or consulted any other health care providers outside of [facility] since your last visit?”    NO    “Have you had a colorectal cancer screening such as a colonoscopy/FIT/Cologuard?    NO     Have you had a mammogram?”   NO            
Anticipated time course and progression of condition reviewed. All questions have been addressed.  She received an after visit summary, with information reviewed, and questions answered.      Where appropriate, she is instructed to call the clinic if she has not been notified either by phone or through [employer] with the results of her tests or with an appointment plan for any referrals within 1 week(s). The patient  is to call if her condition worsens or fails to improve or if significant side effects are experienced.       VAMSHI Snowden - NP

## 2024-04-01 ENCOUNTER — TELEPHONE (OUTPATIENT)
Dept: FAMILY MEDICINE CLINIC | Facility: CLINIC | Age: 64
End: 2024-04-01

## 2024-04-01 NOTE — TELEPHONE ENCOUNTER
Pt called the office and complained of a sore throat and difficulty breathing since Saturday, 3/30.  Nurse suggested that the pt go to urgent care due to Mrs. Jenkins being full today and tomorrow (unsure if the provider was leaving early or not).  Pt understood and aware.

## 2024-04-09 DIAGNOSIS — E03.9 HYPOTHYROIDISM, UNSPECIFIED TYPE: ICD-10-CM

## 2024-04-09 RX ORDER — LEVOTHYROXINE SODIUM 0.1 MG/1
100 TABLET ORAL DAILY
Qty: 90 TABLET | Refills: 0 | Status: SHIPPED | OUTPATIENT
Start: 2024-04-09

## 2024-04-09 NOTE — TELEPHONE ENCOUNTER
Patient called and wants her thyroid medication filled for 90 days. States NINO Jenkins did her follow up 3/11/2024 and told her to continue same dose. Refill sent per verbal order from Isabel Jenkins NP.

## 2024-07-10 DIAGNOSIS — E03.9 HYPOTHYROIDISM, UNSPECIFIED TYPE: ICD-10-CM

## 2024-07-10 RX ORDER — LEVOTHYROXINE SODIUM 0.1 MG/1
100 TABLET ORAL DAILY
Qty: 90 TABLET | Refills: 0 | Status: SHIPPED | OUTPATIENT
Start: 2024-07-10

## 2024-07-24 ENCOUNTER — COMMUNITY OUTREACH (OUTPATIENT)
Facility: CLINIC | Age: 64
End: 2024-07-24

## 2024-09-23 ENCOUNTER — COMMUNITY OUTREACH (OUTPATIENT)
Facility: CLINIC | Age: 64
End: 2024-09-23

## 2024-10-14 DIAGNOSIS — E03.9 HYPOTHYROIDISM, UNSPECIFIED TYPE: ICD-10-CM

## 2024-10-14 RX ORDER — LEVOTHYROXINE SODIUM 100 UG/1
100 TABLET ORAL DAILY
Qty: 30 TABLET | Refills: 0 | Status: SHIPPED | OUTPATIENT
Start: 2024-10-14

## 2024-10-21 ENCOUNTER — OFFICE VISIT (OUTPATIENT)
Facility: CLINIC | Age: 64
End: 2024-10-21
Payer: COMMERCIAL

## 2024-10-21 VITALS
HEART RATE: 72 BPM | OXYGEN SATURATION: 97 % | SYSTOLIC BLOOD PRESSURE: 115 MMHG | WEIGHT: 250.2 LBS | BODY MASS INDEX: 42.72 KG/M2 | RESPIRATION RATE: 20 BRPM | TEMPERATURE: 97.9 F | DIASTOLIC BLOOD PRESSURE: 73 MMHG | HEIGHT: 64 IN

## 2024-10-21 DIAGNOSIS — Z78.0 POST-MENOPAUSAL: ICD-10-CM

## 2024-10-21 DIAGNOSIS — R73.9 ELEVATED RANDOM BLOOD GLUCOSE LEVEL: ICD-10-CM

## 2024-10-21 DIAGNOSIS — E53.8 B12 DEFICIENCY: ICD-10-CM

## 2024-10-21 DIAGNOSIS — E55.9 VITAMIN D DEFICIENCY: ICD-10-CM

## 2024-10-21 DIAGNOSIS — I10 ESSENTIAL (PRIMARY) HYPERTENSION: ICD-10-CM

## 2024-10-21 DIAGNOSIS — D64.9 ANEMIA, UNSPECIFIED TYPE: ICD-10-CM

## 2024-10-21 DIAGNOSIS — K21.9 GASTROESOPHAGEAL REFLUX DISEASE WITHOUT ESOPHAGITIS: ICD-10-CM

## 2024-10-21 DIAGNOSIS — E03.9 HYPOTHYROIDISM, UNSPECIFIED TYPE: Primary | ICD-10-CM

## 2024-10-21 DIAGNOSIS — R79.89 ELEVATED SERUM CREATININE: ICD-10-CM

## 2024-10-21 DIAGNOSIS — I25.10 ATHEROSCLEROSIS OF NATIVE CORONARY ARTERY OF NATIVE HEART WITHOUT ANGINA PECTORIS: ICD-10-CM

## 2024-10-21 DIAGNOSIS — Z12.31 ENCOUNTER FOR SCREENING MAMMOGRAM FOR MALIGNANT NEOPLASM OF BREAST: ICD-10-CM

## 2024-10-21 PROCEDURE — 3078F DIAST BP <80 MM HG: CPT | Performed by: FAMILY MEDICINE

## 2024-10-21 PROCEDURE — 99214 OFFICE O/P EST MOD 30 MIN: CPT | Performed by: FAMILY MEDICINE

## 2024-10-21 PROCEDURE — 1123F ACP DISCUSS/DSCN MKR DOCD: CPT | Performed by: FAMILY MEDICINE

## 2024-10-21 PROCEDURE — 3074F SYST BP LT 130 MM HG: CPT | Performed by: FAMILY MEDICINE

## 2024-10-21 RX ORDER — LEVOTHYROXINE SODIUM 100 UG/1
100 TABLET ORAL DAILY
Qty: 30 TABLET | Refills: 0 | Status: SHIPPED | OUTPATIENT
Start: 2024-10-21

## 2024-10-21 RX ORDER — VITAMIN E (DL,TOCOPHERYL ACET) 180 MG
1000 CAPSULE ORAL EVERY OTHER DAY
Qty: 45 TABLET | Refills: 1 | Status: SHIPPED | OUTPATIENT
Start: 2024-10-21

## 2024-10-21 RX ORDER — ASPIRIN 81 MG/1
81 TABLET ORAL DAILY
COMMUNITY

## 2024-10-21 RX ORDER — PANTOPRAZOLE SODIUM 40 MG/1
40 TABLET, DELAYED RELEASE ORAL DAILY
Qty: 90 TABLET | Refills: 1 | Status: SHIPPED | OUTPATIENT
Start: 2024-10-21

## 2024-10-21 SDOH — ECONOMIC STABILITY: FOOD INSECURITY: WITHIN THE PAST 12 MONTHS, THE FOOD YOU BOUGHT JUST DIDN'T LAST AND YOU DIDN'T HAVE MONEY TO GET MORE.: PATIENT DECLINED

## 2024-10-21 SDOH — ECONOMIC STABILITY: INCOME INSECURITY: HOW HARD IS IT FOR YOU TO PAY FOR THE VERY BASICS LIKE FOOD, HOUSING, MEDICAL CARE, AND HEATING?: PATIENT DECLINED

## 2024-10-21 SDOH — ECONOMIC STABILITY: FOOD INSECURITY: WITHIN THE PAST 12 MONTHS, YOU WORRIED THAT YOUR FOOD WOULD RUN OUT BEFORE YOU GOT MONEY TO BUY MORE.: PATIENT DECLINED

## 2024-10-21 ASSESSMENT — PATIENT HEALTH QUESTIONNAIRE - PHQ9
2. FEELING DOWN, DEPRESSED OR HOPELESS: SEVERAL DAYS
1. LITTLE INTEREST OR PLEASURE IN DOING THINGS: NOT AT ALL
SUM OF ALL RESPONSES TO PHQ QUESTIONS 1-9: 1
SUM OF ALL RESPONSES TO PHQ9 QUESTIONS 1 & 2: 1
SUM OF ALL RESPONSES TO PHQ QUESTIONS 1-9: 1

## 2024-10-21 NOTE — PROGRESS NOTES
Meaghan Duran (: 1960) is a 64 y.o. female here for evaluation of the following chief concern(s):  Chronic condition management       ASSESSMENT/PLAN:  1. Hypothyroidism, unspecified type  -     levothyroxine (SYNTHROID) 100 MCG tablet; Take 1 tablet by mouth daily, Disp-30 tablet, R-0Normal  2. Essential (primary) hypertension  3. Elevated serum creatinine  -     Basic Metabolic Panel; Future  -     Microalbumin / Creatinine Urine Ratio; Future  4. Gastroesophageal reflux disease without esophagitis  -     pantoprazole (PROTONIX) 40 MG tablet; Take 1 tablet by mouth daily, Disp-90 tablet, R-1Normal  -     TSH; Future  5. Anemia, unspecified type  -     CBC with Auto Differential; Future  -     Iron and TIBC; Future  -     Ferritin; Future  6. B12 deficiency  -     B-12 Methylcobalamin 1000 MCG TBDP; Take 1,000 mcg by mouth every other day, Disp-45 tablet, R-1Print  -     Vitamin B12; Future  7. Elevated random blood glucose level  -     Hemoglobin A1C; Future  8. Vitamin D deficiency  -     Vitamin D 25 Hydroxy; Future  9. Atherosclerosis of native coronary artery of native heart without angina pectoris  10. Encounter for screening mammogram for malignant neoplasm of breast  -     DIGNA DEISY DIGITAL SCREEN BILATERAL; Future  11. Post-menopausal  -     DEXA BONE DENSITY AXIAL SKELETON; Future    Ms. Duran appears medically stable.  Normotensive; diet controlled.  Clinically euthyroid, continue current dose of Levothyroxine.  Eval for prediabetes.    Continue PPI for symptom management.  Replete B12 w/ active form of vitamin.  CAD asymptomatic, hx pt declined statin at this time, will continue ASA.    Healthy maintenance as above; mammogram, DEXA ideally completed ~2 weeks prior to next visit.    Update labs to better assess health status.      Return in about 4 weeks (around 2024) for follow-up chronic conditions or sooner if needed, labs 2-3 days prior to appointment.      Meaghan Duran agrees

## 2024-10-21 NOTE — PROGRESS NOTES
Chief Complaint   Patient presents with    Follow-up     Chronic disease     Patient states her Iron level when she went to give blood was 12.3.  \"Have you been to the ER, urgent care clinic since your last visit?  Hospitalized since your last visit?\"    NO    “Have you seen or consulted any other health care providers outside of [facility] since your last visit?”    NO    “Have you had a colorectal cancer screening such as a colonoscopy/FIT/Cologuard?    NO    Patient encouraged to complete FIT and mail

## 2024-10-31 ENCOUNTER — HOSPITAL ENCOUNTER (OUTPATIENT)
Age: 64
Discharge: HOME | End: 2024-11-03
Attending: FAMILY MEDICINE
Payer: COMMERCIAL

## 2024-10-31 VITALS — HEIGHT: 65 IN | WEIGHT: 247 LBS | BODY MASS INDEX: 41.15 KG/M2

## 2024-10-31 DIAGNOSIS — Z78.0 POST-MENOPAUSAL: ICD-10-CM

## 2024-10-31 DIAGNOSIS — Z12.31 ENCOUNTER FOR SCREENING MAMMOGRAM FOR MALIGNANT NEOPLASM OF BREAST: ICD-10-CM

## 2024-10-31 PROCEDURE — 77063 BREAST TOMOSYNTHESIS BI: CPT

## 2024-10-31 PROCEDURE — 77080 DXA BONE DENSITY AXIAL: CPT

## 2024-11-25 ENCOUNTER — OFFICE VISIT (OUTPATIENT)
Facility: CLINIC | Age: 64
End: 2024-11-25

## 2024-11-25 VITALS
BODY MASS INDEX: 41.12 KG/M2 | WEIGHT: 246.8 LBS | RESPIRATION RATE: 20 BRPM | TEMPERATURE: 97.7 F | HEIGHT: 65 IN | OXYGEN SATURATION: 95 % | HEART RATE: 73 BPM | DIASTOLIC BLOOD PRESSURE: 74 MMHG | SYSTOLIC BLOOD PRESSURE: 122 MMHG

## 2024-11-25 DIAGNOSIS — E03.9 HYPOTHYROIDISM, UNSPECIFIED TYPE: Primary | ICD-10-CM

## 2024-11-25 DIAGNOSIS — M85.80 OSTEOPENIA, UNSPECIFIED LOCATION: ICD-10-CM

## 2024-11-25 DIAGNOSIS — R73.9 ELEVATED RANDOM BLOOD GLUCOSE LEVEL: ICD-10-CM

## 2024-11-25 DIAGNOSIS — E55.9 VITAMIN D DEFICIENCY: ICD-10-CM

## 2024-11-25 DIAGNOSIS — K21.9 GASTROESOPHAGEAL REFLUX DISEASE WITHOUT ESOPHAGITIS: ICD-10-CM

## 2024-11-25 DIAGNOSIS — D64.9 ANEMIA, UNSPECIFIED TYPE: ICD-10-CM

## 2024-11-25 DIAGNOSIS — I25.10 ATHEROSCLEROSIS OF NATIVE CORONARY ARTERY OF NATIVE HEART WITHOUT ANGINA PECTORIS: ICD-10-CM

## 2024-11-25 DIAGNOSIS — R79.89 ELEVATED SERUM CREATININE: ICD-10-CM

## 2024-11-25 DIAGNOSIS — E53.8 B12 DEFICIENCY: ICD-10-CM

## 2024-11-25 RX ORDER — LEVOTHYROXINE SODIUM 100 UG/1
100 TABLET ORAL DAILY
Qty: 90 TABLET | Refills: 0 | Status: CANCELLED | OUTPATIENT
Start: 2024-11-25

## 2024-11-25 ASSESSMENT — PATIENT HEALTH QUESTIONNAIRE - PHQ9
2. FEELING DOWN, DEPRESSED OR HOPELESS: NOT AT ALL
SUM OF ALL RESPONSES TO PHQ9 QUESTIONS 1 & 2: 0
1. LITTLE INTEREST OR PLEASURE IN DOING THINGS: NOT AT ALL
SUM OF ALL RESPONSES TO PHQ QUESTIONS 1-9: 0

## 2024-11-25 NOTE — PROGRESS NOTES
Chief Complaint   Patient presents with    Follow-up     Chronic disease       \"Have you been to the ER, urgent care clinic since your last visit?  Hospitalized since your last visit?\"    NO    “Have you seen or consulted any other health care providers outside our system since your last visit?”    NO      “Have you had a colorectal cancer screening such as a colonoscopy/FIT/Cologuard?    NO  I have reminded patient that her FIT test needs to be completed as soon as possible and mailed.        Verbal order from Abril Garcia MD to order labs/sign and draw them in office    Labs were drawn and sent to [facility] by Yessica Sequeira LPN:    The following tubes were sent:    1 Lavendar, 0 Red, 3 SST, 1 Urine    Draw site right antecubital.  Patient tolerated draw with no distress.

## 2024-11-25 NOTE — PROGRESS NOTES
Meaghan Duran (: 1960) is a 64 y.o. female here for evaluation of the following chief concern(s):  Chronic condition management     ASSESSMENT/PLAN:  1. Hypothyroidism, unspecified type  -     COLLECTION VENOUS BLOOD,VENIPUNCTURE  -     TSH; Future  2. Osteopenia, unspecified location  3. Vitamin D deficiency  -     Vitamin D 25 Hydroxy  4. Atherosclerosis of native coronary artery of native heart without angina pectoris  5. Elevated serum creatinine  -     Microalbumin / Creatinine Urine Ratio  -     Basic Metabolic Panel  6. Anemia, unspecified type  -     Ferritin  -     Iron and TIBC  -     CBC with Auto Differential  7. Gastroesophageal reflux disease without esophagitis  -     TSH  8. Elevated random blood glucose level  -     Hemoglobin A1C  9. B12 deficiency  -     Vitamin B12    Ms. Duran appears medically stable.  Normotensive; diet controlled.  Clinically euthyroid, continue current dose of Levothyroxine 100mcg; refill based on updated TSH drawn today; pt agrees will increase Levothyroxine to 112mcg if TSH remains >2  Eval for prediabetes.    Continue PPI for symptom management.  Replete B12 w/ active form of vitamin.  CAD asymptomatic, pt still declines statin at this time-  had side effects, will continue ASA.    Next DEXA due ~2025 to have an additional data point and if largely unchanged or improved will then check ~Q2 years.  We reviewed to continue vitamin D supplement, safe home exercises, fall risk reduction; AVS w/ printed information.      Return in about 4 months (around 3/25/2025) for follow-up chronic conditions or sooner if needed, labs 2-3 days prior to appointment.      Meaghan Duran agrees with plan as above and has no additional questions at this time.       SUBJECTIVE/OBJECTIVE:  Overall, pt is feeling \"good\".  Acute concerns: None    10/31/24 Mammogram:  BI-RADS 1: Negative. No mammographic evidence of malignancy.  RECOMMENDATIONS: Next screening mammogram is

## 2024-11-26 LAB
25(OH)D3+25(OH)D2 SERPL-MCNC: 12.3 NG/ML (ref 30–100)
ALBUMIN/CREAT UR: 8 MG/G CREAT (ref 0–29)
BASOPHILS # BLD AUTO: 0.1 X10E3/UL (ref 0–0.2)
BASOPHILS NFR BLD AUTO: 1 %
BUN SERPL-MCNC: 17 MG/DL (ref 8–27)
BUN/CREAT SERPL: 17 (ref 12–28)
CALCIUM SERPL-MCNC: 9.2 MG/DL (ref 8.7–10.3)
CHLORIDE SERPL-SCNC: 105 MMOL/L (ref 96–106)
CO2 SERPL-SCNC: 21 MMOL/L (ref 20–29)
CREAT SERPL-MCNC: 1.02 MG/DL (ref 0.57–1)
CREAT UR-MCNC: 149.5 MG/DL
EGFRCR SERPLBLD CKD-EPI 2021: 61 ML/MIN/1.73
EOSINOPHIL # BLD AUTO: 0.2 X10E3/UL (ref 0–0.4)
EOSINOPHIL NFR BLD AUTO: 4 %
ERYTHROCYTE [DISTWIDTH] IN BLOOD BY AUTOMATED COUNT: 13 % (ref 11.7–15.4)
FERRITIN SERPL-MCNC: 25 NG/ML (ref 15–150)
GLUCOSE SERPL-MCNC: 108 MG/DL (ref 70–99)
HBA1C MFR BLD: 5.6 % (ref 4.8–5.6)
HCT VFR BLD AUTO: 40.8 % (ref 34–46.6)
HGB BLD-MCNC: 13.2 G/DL (ref 11.1–15.9)
IMM GRANULOCYTES # BLD AUTO: 0 X10E3/UL (ref 0–0.1)
IMM GRANULOCYTES NFR BLD AUTO: 0 %
IRON SATN MFR SERPL: 25 % (ref 15–55)
IRON SERPL-MCNC: 97 UG/DL (ref 27–139)
LYMPHOCYTES # BLD AUTO: 1.1 X10E3/UL (ref 0.7–3.1)
LYMPHOCYTES NFR BLD AUTO: 24 %
MCH RBC QN AUTO: 27.7 PG (ref 26.6–33)
MCHC RBC AUTO-ENTMCNC: 32.4 G/DL (ref 31.5–35.7)
MCV RBC AUTO: 86 FL (ref 79–97)
MICROALBUMIN UR-MCNC: 12.5 UG/ML
MONOCYTES # BLD AUTO: 0.4 X10E3/UL (ref 0.1–0.9)
MONOCYTES NFR BLD AUTO: 8 %
NEUTROPHILS # BLD AUTO: 2.9 X10E3/UL (ref 1.4–7)
NEUTROPHILS NFR BLD AUTO: 63 %
PLATELET # BLD AUTO: 156 X10E3/UL (ref 150–450)
POTASSIUM SERPL-SCNC: 3.9 MMOL/L (ref 3.5–5.2)
RBC # BLD AUTO: 4.76 X10E6/UL (ref 3.77–5.28)
SODIUM SERPL-SCNC: 142 MMOL/L (ref 134–144)
SPECIMEN STATUS REPORT: NORMAL
TIBC SERPL-MCNC: 381 UG/DL (ref 250–450)
TSH SERPL DL<=0.005 MIU/L-ACNC: 4.89 UIU/ML (ref 0.45–4.5)
UIBC SERPL-MCNC: 284 UG/DL (ref 118–369)
VIT B12 SERPL-MCNC: 279 PG/ML (ref 232–1245)
WBC # BLD AUTO: 4.7 X10E3/UL (ref 3.4–10.8)

## 2024-12-08 DIAGNOSIS — E03.9 HYPOTHYROIDISM, UNSPECIFIED TYPE: ICD-10-CM

## 2024-12-10 RX ORDER — LEVOTHYROXINE SODIUM 100 UG/1
100 TABLET ORAL DAILY
Qty: 90 TABLET | Refills: 0 | Status: SHIPPED | OUTPATIENT
Start: 2024-12-10

## 2025-03-11 DIAGNOSIS — E03.9 HYPOTHYROIDISM, UNSPECIFIED TYPE: ICD-10-CM

## 2025-03-13 RX ORDER — LEVOTHYROXINE SODIUM 100 UG/1
100 TABLET ORAL DAILY
Qty: 90 TABLET | Refills: 0 | Status: SHIPPED | OUTPATIENT
Start: 2025-03-13

## 2025-03-14 ENCOUNTER — COMMUNITY OUTREACH (OUTPATIENT)
Facility: CLINIC | Age: 65
End: 2025-03-14

## 2025-03-24 ENCOUNTER — HOSPITAL ENCOUNTER (OUTPATIENT)
Age: 65
Setting detail: SPECIMEN
Discharge: HOME | End: 2025-03-27
Payer: COMMERCIAL

## 2025-03-24 DIAGNOSIS — E03.9 HYPOTHYROIDISM, UNSPECIFIED TYPE: ICD-10-CM

## 2025-03-24 LAB — TSH SERPL DL<=0.05 MIU/L-ACNC: 2.15 UIU/ML (ref 0.36–3.74)

## 2025-03-24 PROCEDURE — 36415 COLL VENOUS BLD VENIPUNCTURE: CPT

## 2025-03-24 PROCEDURE — 84443 ASSAY THYROID STIM HORMONE: CPT

## 2025-03-25 ENCOUNTER — RESULTS FOLLOW-UP (OUTPATIENT)
Facility: CLINIC | Age: 65
End: 2025-03-25

## 2025-04-16 ENCOUNTER — OFFICE VISIT (OUTPATIENT)
Facility: CLINIC | Age: 65
End: 2025-04-16
Payer: COMMERCIAL

## 2025-04-16 VITALS
SYSTOLIC BLOOD PRESSURE: 130 MMHG | RESPIRATION RATE: 20 BRPM | BODY MASS INDEX: 41.75 KG/M2 | HEIGHT: 65 IN | TEMPERATURE: 97 F | DIASTOLIC BLOOD PRESSURE: 60 MMHG | HEART RATE: 61 BPM | OXYGEN SATURATION: 98 % | WEIGHT: 250.6 LBS

## 2025-04-16 DIAGNOSIS — Z12.11 SCREEN FOR COLON CANCER: ICD-10-CM

## 2025-04-16 DIAGNOSIS — I25.119 CORONARY ARTERY DISEASE INVOLVING NATIVE CORONARY ARTERY OF NATIVE HEART WITH ANGINA PECTORIS: ICD-10-CM

## 2025-04-16 DIAGNOSIS — E53.8 B12 DEFICIENCY: ICD-10-CM

## 2025-04-16 DIAGNOSIS — E55.9 VITAMIN D DEFICIENCY: ICD-10-CM

## 2025-04-16 DIAGNOSIS — M51.360 DEGENERATION OF INTERVERTEBRAL DISC OF LUMBAR REGION WITH DISCOGENIC BACK PAIN: ICD-10-CM

## 2025-04-16 DIAGNOSIS — M43.10 SPONDYLOLISTHESIS, UNSPECIFIED SPINAL REGION: ICD-10-CM

## 2025-04-16 DIAGNOSIS — M54.16 LUMBAR NEURITIS: ICD-10-CM

## 2025-04-16 DIAGNOSIS — M48.061 SPINAL STENOSIS OF LUMBAR REGION, UNSPECIFIED WHETHER NEUROGENIC CLAUDICATION PRESENT: Primary | ICD-10-CM

## 2025-04-16 DIAGNOSIS — E03.9 HYPOTHYROIDISM, UNSPECIFIED TYPE: ICD-10-CM

## 2025-04-16 DIAGNOSIS — M43.07 LUMBOSACRAL SPONDYLOLYSIS: ICD-10-CM

## 2025-04-16 PROCEDURE — 1123F ACP DISCUSS/DSCN MKR DOCD: CPT | Performed by: FAMILY MEDICINE

## 2025-04-16 PROCEDURE — 99214 OFFICE O/P EST MOD 30 MIN: CPT | Performed by: FAMILY MEDICINE

## 2025-04-16 RX ORDER — VITAMIN E (DL,TOCOPHERYL ACET) 180 MG
1000 CAPSULE ORAL DAILY
Qty: 90 TABLET | Refills: 1 | Status: SHIPPED | OUTPATIENT
Start: 2025-04-16

## 2025-04-16 RX ORDER — MAGNESIUM GLYCINATE 100 MG
100 TABLET ORAL
Qty: 90 TABLET | Refills: 0 | Status: SHIPPED | OUTPATIENT
Start: 2025-04-16

## 2025-04-16 RX ORDER — ERGOCALCIFEROL 1.25 MG/1
50000 CAPSULE, LIQUID FILLED ORAL WEEKLY
Qty: 4 CAPSULE | Refills: 0 | Status: SHIPPED | OUTPATIENT
Start: 2025-04-16

## 2025-04-16 RX ORDER — ACETAMINOPHEN 160 MG
2000 TABLET,DISINTEGRATING ORAL
Qty: 90 CAPSULE | Refills: 1 | Status: SHIPPED | OUTPATIENT
Start: 2025-04-16

## 2025-04-16 RX ORDER — VITAMIN E (DL,TOCOPHERYL ACET) 180 MG
1000 CAPSULE ORAL DAILY
Qty: 90 TABLET | Refills: 1 | Status: SHIPPED | OUTPATIENT
Start: 2025-04-16 | End: 2025-04-16

## 2025-04-16 SDOH — ECONOMIC STABILITY: FOOD INSECURITY: WITHIN THE PAST 12 MONTHS, THE FOOD YOU BOUGHT JUST DIDN'T LAST AND YOU DIDN'T HAVE MONEY TO GET MORE.: PATIENT DECLINED

## 2025-04-16 SDOH — ECONOMIC STABILITY: FOOD INSECURITY: WITHIN THE PAST 12 MONTHS, YOU WORRIED THAT YOUR FOOD WOULD RUN OUT BEFORE YOU GOT MONEY TO BUY MORE.: PATIENT DECLINED

## 2025-04-16 ASSESSMENT — PATIENT HEALTH QUESTIONNAIRE - PHQ9
SUM OF ALL RESPONSES TO PHQ QUESTIONS 1-9: 0
SUM OF ALL RESPONSES TO PHQ QUESTIONS 1-9: 0
2. FEELING DOWN, DEPRESSED OR HOPELESS: NOT AT ALL
1. LITTLE INTEREST OR PLEASURE IN DOING THINGS: NOT AT ALL
SUM OF ALL RESPONSES TO PHQ QUESTIONS 1-9: 0
SUM OF ALL RESPONSES TO PHQ QUESTIONS 1-9: 0

## 2025-04-16 NOTE — PROGRESS NOTES
Meaghan Duran (: 1960) is a 65 y.o. female here for evaluation of the following chief concern(s):  Chronic condition management     ASSESSMENT/PLAN:  1. Spinal stenosis of lumbar region, unspecified whether neurogenic claudication present  -     [facility]  2. Degeneration of intervertebral disc of lumbar region with discogenic back pain  -     [facility] - Abraham Ramirez MD, Physical Medicine & Rehab, [place] ([facility])  3. Lumbosacral spondylolysis  -     [facility]  4. Lumbar neuritis  -     [facility] - [facility], [facility]  5. Spondylolisthesis, unspecified spinal region  -     [facility] - Abraham Raimrez MD, [facility] [place] ([facility])  6. Hypothyroidism, unspecified type  7. B12 deficiency  -     B-12 Methylcobalamin 1000 MCG TBDP; Take 1,000 mcg by mouth daily, Disp-90 tablet, R-1Print  -     Vitamin B12; Future  8. Vitamin D deficiency  -     vitamin D (ERGOCALCIFEROL) 1.25 MG (35990 UT) CAPS capsule; Take 1 capsule by mouth once a week, Disp-4 capsule, R-0Print  -     vitamin D (VITAMIN D3) 50 MCG (2000 UT) CAPS capsule; Take 1 capsule by mouth Daily with supper, Disp-90 capsule, R-1Print  -     Magnesium Bisglycinate (MAG GLYCINATE) 100 MG TABS; Take 100 mg by mouth Daily with supper, Disp-90 tablet, R-0Print  -     Vitamin D 25 Hydroxy; Future  9. Coronary artery disease involving native coronary artery of native heart with angina pectoris  -     [facility] - Meghan Rubin DO, [facility] ([facility])  -     Lipid Panel; Future  10. Body mass index [BMI] 40.0-44.9, adult (Z68.41)  11. Screen for colon cancer  -     Occult Blood Stool Immunoassay; Future    Ms. Duran appears medically stable.  Normotensive.    Pt requests referral back to Dr. Ramirez to

## 2025-04-16 NOTE — PROGRESS NOTES
Chief Complaint   Patient presents with    Follow-up     Chronic disease       \"Have you been to the ER, urgent care clinic since your last visit?  Hospitalized since your last visit?\"    NO    “Have you seen or consulted any other health care providers outside our system since your last visit?”    NO      “Have you had a colorectal cancer screening such as a colonoscopy/FIT/Cologuard?    NO    PATIENT REMINDED AGAIN TO COMPLETE FIT.

## 2025-05-05 DIAGNOSIS — K21.9 GASTROESOPHAGEAL REFLUX DISEASE WITHOUT ESOPHAGITIS: ICD-10-CM

## 2025-05-05 RX ORDER — PANTOPRAZOLE SODIUM 40 MG/1
40 TABLET, DELAYED RELEASE ORAL DAILY
Qty: 90 TABLET | Refills: 0 | Status: SHIPPED | OUTPATIENT
Start: 2025-05-05

## 2025-06-03 ENCOUNTER — OFFICE VISIT (OUTPATIENT)
Age: 65
End: 2025-06-03
Payer: COMMERCIAL

## 2025-06-03 VITALS
BODY MASS INDEX: 45.27 KG/M2 | RESPIRATION RATE: 18 BRPM | WEIGHT: 246 LBS | TEMPERATURE: 98.2 F | HEIGHT: 62 IN | OXYGEN SATURATION: 95 % | HEART RATE: 79 BPM

## 2025-06-03 DIAGNOSIS — M96.1 LUMBAR POST-LAMINECTOMY SYNDROME: ICD-10-CM

## 2025-06-03 DIAGNOSIS — M54.16 LUMBAR NEURITIS: ICD-10-CM

## 2025-06-03 DIAGNOSIS — M54.50 LUMBAR PAIN: Primary | ICD-10-CM

## 2025-06-03 DIAGNOSIS — M47.816 LUMBAR SPONDYLOSIS: ICD-10-CM

## 2025-06-03 DIAGNOSIS — M51.362 DEGENERATION OF INTERVERTEBRAL DISC OF LUMBAR REGION WITH DISCOGENIC BACK PAIN AND LOWER EXTREMITY PAIN: ICD-10-CM

## 2025-06-03 PROCEDURE — 1123F ACP DISCUSS/DSCN MKR DOCD: CPT | Performed by: PHYSICAL MEDICINE & REHABILITATION

## 2025-06-03 PROCEDURE — 72110 X-RAY EXAM L-2 SPINE 4/>VWS: CPT | Performed by: PHYSICAL MEDICINE & REHABILITATION

## 2025-06-03 PROCEDURE — 99204 OFFICE O/P NEW MOD 45 MIN: CPT | Performed by: PHYSICAL MEDICINE & REHABILITATION

## 2025-06-03 RX ORDER — METHYLPREDNISOLONE 4 MG/1
TABLET ORAL
Qty: 1 KIT | Refills: 0 | Status: SHIPPED | OUTPATIENT
Start: 2025-06-03

## 2025-06-03 RX ORDER — TOPIRAMATE 25 MG/1
75 TABLET, FILM COATED ORAL NIGHTLY
Qty: 90 TABLET | Refills: 2 | Status: SHIPPED | OUTPATIENT
Start: 2025-06-03

## 2025-06-03 RX ORDER — IBUPROFEN 200 MG
200 TABLET ORAL EVERY 6 HOURS PRN
COMMUNITY

## 2025-06-03 RX ORDER — GABAPENTIN 300 MG/1
300 CAPSULE ORAL 3 TIMES DAILY
Qty: 90 CAPSULE | Refills: 2 | Status: CANCELLED | OUTPATIENT
Start: 2025-06-03 | End: 2025-09-01

## 2025-06-03 NOTE — PROGRESS NOTES
[facility]  [address]  [place] [address]  Tel: [phone]  Fax: [phone]          INITIAL CONSULTATION      HISTORY OF PRESENT ILLNESS:  Meaghan Duran is a 65 y.o. female who is referred from Abril Garcia MD secondary to lumbar spinal stenosis. She rates her pain  3-10 /10. Patient comes into the office with c/o lower back pain with radicular pain into the BLE (R>L), extending in a S1 distribution to just below the knees additionally c/o distal bilateral foot paresthesias, ongoing for 6 months with no specific injury. Patient is s/p lumbar spinal fusion performed by Braden Leggett MD in 2021. States significant benefit was provided however, she did experience some stiffness.     Patient says her pain is progressive in nature. Patient reports her bladder incontinence continues s/p did not improve with surgery. She denies change in bowel habits. Her pain is exacerbated by standing and walking; alleviated with sitting. Positive shopping cart sign. She denies recent fevers, weight loss, rashes, or skin sores. She denies a hx of stomach ulcers or bleeding disorders. Patient has a hx of lumbar spinal surgery. She denies recent physical therapy or chiropractic care. She denies a hx of DM. She reports that to her knowledge her kidneys function properly, GFR of 61 on 11/25/2024. She has taken Ibuprofen 200 mg QID; denies benefit.       PmHx of CAD (ASPIRIN), obesity, L4-5 Laminectomy and fusion (3/29/2024 performed by ), former smoker    Note from  dated 4/16/2025 indicating patient was seen for Chronic back pain gradually worsened, has affected function over the past 4 months. No paresthesias or weakness in LEs. She had surgery w/ Dr. Leggett in the past, called his office and was advised to start back w/ Dr. Ramirez.     Note from me dated 2/22/2021 indicating patient was seen for progressive lower back pain radiating into the BLE

## 2025-06-09 ENCOUNTER — OFFICE VISIT (OUTPATIENT)
Facility: CLINIC | Age: 65
End: 2025-06-09
Payer: COMMERCIAL

## 2025-06-09 VITALS
SYSTOLIC BLOOD PRESSURE: 130 MMHG | HEIGHT: 62 IN | OXYGEN SATURATION: 94 % | WEIGHT: 243.4 LBS | BODY MASS INDEX: 44.79 KG/M2 | RESPIRATION RATE: 20 BRPM | HEART RATE: 58 BPM | TEMPERATURE: 98.1 F | DIASTOLIC BLOOD PRESSURE: 79 MMHG

## 2025-06-09 DIAGNOSIS — M48.061 SPINAL STENOSIS OF LUMBAR REGION, UNSPECIFIED WHETHER NEUROGENIC CLAUDICATION PRESENT: ICD-10-CM

## 2025-06-09 DIAGNOSIS — K21.9 GASTROESOPHAGEAL REFLUX DISEASE WITHOUT ESOPHAGITIS: ICD-10-CM

## 2025-06-09 DIAGNOSIS — I25.119 CORONARY ARTERY DISEASE INVOLVING NATIVE CORONARY ARTERY OF NATIVE HEART WITH ANGINA PECTORIS: ICD-10-CM

## 2025-06-09 DIAGNOSIS — E55.9 VITAMIN D DEFICIENCY: ICD-10-CM

## 2025-06-09 DIAGNOSIS — E53.8 B12 DEFICIENCY: ICD-10-CM

## 2025-06-09 DIAGNOSIS — E03.9 HYPOTHYROIDISM, UNSPECIFIED TYPE: Primary | ICD-10-CM

## 2025-06-09 PROCEDURE — 99214 OFFICE O/P EST MOD 30 MIN: CPT | Performed by: FAMILY MEDICINE

## 2025-06-09 PROCEDURE — 1123F ACP DISCUSS/DSCN MKR DOCD: CPT | Performed by: FAMILY MEDICINE

## 2025-06-09 RX ORDER — PANTOPRAZOLE SODIUM 40 MG/1
40 TABLET, DELAYED RELEASE ORAL DAILY
Qty: 90 TABLET | Refills: 0 | Status: SHIPPED | OUTPATIENT
Start: 2025-06-09

## 2025-06-09 RX ORDER — LEVOTHYROXINE SODIUM 100 UG/1
100 TABLET ORAL DAILY
Qty: 90 TABLET | Refills: 1 | Status: SHIPPED | OUTPATIENT
Start: 2025-06-09

## 2025-06-09 NOTE — PROGRESS NOTES
Meaghan Duran (: 1960) is a 65 y.o. female here for evaluation of the following chief concern(s):  Chronic condition management     ASSESSMENT/PLAN:  1. Hypothyroidism, unspecified type  -     levothyroxine (SYNTHROID) 100 MCG tablet; Take 1 tablet by mouth daily, Disp-90 tablet, R-1Normal  -     COLLECTION VENOUS BLOOD,VENIPUNCTURE  -     TSH; Future  2. Spinal stenosis of lumbar region, unspecified whether neurogenic claudication present  3. Coronary artery disease involving native coronary artery of native heart with angina pectoris  4. Vitamin D deficiency  5. B12 deficiency  6. Gastroesophageal reflux disease without esophagitis  -     pantoprazole (PROTONIX) 40 MG tablet; Take 1 tablet by mouth daily .  Take every other day if tolerated., Disp-90 tablet, R-0Normal    Ms. Duran appears medically stable.  Normotensive.    CAD possibly symptomatic, pt again declines statin at this time-  had side effects, will continue ASA and follow lipids.  ER precautions reviewed.      Clinically euthyroid recent TSH WNL, continue current dose of Levothyroxine 100mcg.    Outstanding labs drawn today; she is fasting.    Pending results of stool kit (sent in last week); pt to assure she gets a results.       Next DEXA due ~2025 to have an additional data point and if largely unchanged or improved will then check ~Q2 years.  We reviewed to continue vitamin D supplement, safe home exercises, fall risk reduction; AVS w/ printed information.      Return in about 3 months (around 2025) for EOC w/ new provider or sooner if needed.      Meaghan Duran agrees with plan as above and has no additional questions at this time.       SUBJECTIVE/OBJECTIVE:  Overall, pt is feeling \"good\".    Chart updates;  Pt started on Topiramate and steroid burst by Dr. Ramirez; she just started Saturday, so far tolerating these medications, she is hopeful that this will help and she will not need any procedures/surgery.

## 2025-06-09 NOTE — PROGRESS NOTES
Chief Complaint   Patient presents with    Follow-up     Chronic disease       \"Have you been to the ER, urgent care clinic since your last visit?  Hospitalized since your last visit?\"    NO    “Have you seen or consulted any other health care providers outside our system since your last visit?”    NO      “Have you had a colorectal cancer screening such as a colonoscopy/FIT/Cologuard?    NO    Patient states she sent off her FIT last Thursday evening and did not go out until Friday. Will look for results to come in.

## 2025-06-11 LAB
CHOLEST SERPL-MCNC: 181 MG/DL (ref 100–199)
HDLC SERPL-MCNC: 65 MG/DL
LDLC SERPL CALC-MCNC: 105 MG/DL (ref 0–99)
SPECIMEN STATUS REPORT: NORMAL
TRIGL SERPL-MCNC: 54 MG/DL (ref 0–149)
TSH SERPL DL<=0.005 MIU/L-ACNC: 1.23 UIU/ML (ref 0.45–4.5)
VLDLC SERPL CALC-MCNC: 11 MG/DL (ref 5–40)

## 2025-06-12 LAB — SPECIMEN STATUS REPORT: NORMAL

## 2025-06-13 LAB
25(OH)D3+25(OH)D2 SERPL-MCNC: 10.5 NG/ML (ref 30–100)
VIT B12 SERPL-MCNC: 216 PG/ML (ref 232–1245)

## 2025-06-30 ENCOUNTER — RESULTS FOLLOW-UP (OUTPATIENT)
Facility: CLINIC | Age: 65
End: 2025-06-30

## 2025-06-30 DIAGNOSIS — E53.8 B12 DEFICIENCY: Primary | ICD-10-CM

## 2025-06-30 DIAGNOSIS — E55.9 VITAMIN D DEFICIENCY: ICD-10-CM

## 2025-06-30 RX ORDER — ACETAMINOPHEN 160 MG
2000 TABLET,DISINTEGRATING ORAL DAILY
Qty: 90 CAPSULE | Refills: 0 | Status: SHIPPED | OUTPATIENT
Start: 2025-06-30

## 2025-06-30 RX ORDER — ERGOCALCIFEROL 1.25 MG/1
50000 CAPSULE, LIQUID FILLED ORAL WEEKLY
Qty: 6 CAPSULE | Refills: 0 | Status: SHIPPED | OUTPATIENT
Start: 2025-06-30

## 2025-06-30 RX ORDER — VITAMIN E (DL,TOCOPHERYL ACET) 180 MG
1000 CAPSULE ORAL EVERY MORNING
Qty: 90 TABLET | Refills: 0 | Status: SHIPPED | OUTPATIENT
Start: 2025-06-30

## 2025-06-30 RX ORDER — MAGNESIUM GLYCINATE 100 MG
100 TABLET ORAL
Qty: 90 TABLET | Refills: 0 | Status: SHIPPED | OUTPATIENT
Start: 2025-06-30

## 2025-08-25 ENCOUNTER — OFFICE VISIT (OUTPATIENT)
Age: 65
End: 2025-08-25
Payer: COMMERCIAL

## 2025-08-25 VITALS
HEART RATE: 72 BPM | HEIGHT: 62 IN | BODY MASS INDEX: 44.35 KG/M2 | WEIGHT: 241 LBS | TEMPERATURE: 98 F | OXYGEN SATURATION: 98 %

## 2025-08-25 DIAGNOSIS — M54.16 LUMBAR NEURITIS: ICD-10-CM

## 2025-08-25 DIAGNOSIS — M96.1 LUMBAR POST-LAMINECTOMY SYNDROME: Primary | ICD-10-CM

## 2025-08-25 DIAGNOSIS — M47.816 LUMBAR SPONDYLOSIS: ICD-10-CM

## 2025-08-25 DIAGNOSIS — M51.362 DEGENERATION OF INTERVERTEBRAL DISC OF LUMBAR REGION WITH DISCOGENIC BACK PAIN AND LOWER EXTREMITY PAIN: ICD-10-CM

## 2025-08-25 DIAGNOSIS — M54.50 LUMBAR PAIN: ICD-10-CM

## 2025-08-25 PROCEDURE — 1123F ACP DISCUSS/DSCN MKR DOCD: CPT | Performed by: PHYSICAL MEDICINE & REHABILITATION

## 2025-08-25 PROCEDURE — 99214 OFFICE O/P EST MOD 30 MIN: CPT | Performed by: PHYSICAL MEDICINE & REHABILITATION

## 2025-08-25 RX ORDER — TOPIRAMATE 25 MG/1
75 TABLET, FILM COATED ORAL 2 TIMES DAILY
Qty: 180 TABLET | Refills: 2 | Status: CANCELLED | OUTPATIENT
Start: 2025-08-25

## 2025-08-25 RX ORDER — TOPIRAMATE 25 MG/1
75 TABLET, FILM COATED ORAL 2 TIMES DAILY
Qty: 180 TABLET | Refills: 1 | Status: SHIPPED | OUTPATIENT
Start: 2025-08-25

## (undated) DEVICE — REM POLYHESIVE ADULT PATIENT RETURN ELECTRODE: Brand: VALLEYLAB

## (undated) DEVICE — SOLUTION IV 1000ML 0.9% SOD CHL

## (undated) DEVICE — INTENDED FOR TISSUE SEPARATION, AND OTHER PROCEDURES THAT REQUIRE A SHARP SURGICAL BLADE TO PUNCTURE OR CUT.: Brand: BARD-PARKER SAFETY BLADES SIZE 15, STERILE

## (undated) DEVICE — SUTURE STRATAFIX SYMMETRIC PDS + ETHIGUARD SZ 1 L18IN ABSRB SXPP1A300

## (undated) DEVICE — JACKSON TABLE POSITIONER KIT: Brand: MEDLINE INDUSTRIES, INC.

## (undated) DEVICE — FLEX ADVANTAGE 3000CC: Brand: FLEX ADVANTAGE

## (undated) DEVICE — ADHESIVE SKIN CLOSURE 4X22 CM PREMIERPRO EXOFINFUSION DISP

## (undated) DEVICE — TRAY,URINE METER,100% SILICONE,16FR10ML: Brand: MEDLINE

## (undated) DEVICE — PAD NON-ADHERENT 3X4 STRL LF --

## (undated) DEVICE — SUTURE STRATAFIX SPRL MCRYL + SZ 4-0 L12IN ABSRB UD PS-2 SXMP1B117

## (undated) DEVICE — INTENDED FOR TISSUE SEPARATION, AND OTHER PROCEDURES THAT REQUIRE A SHARP SURGICAL BLADE TO PUNCTURE OR CUT.: Brand: BARD-PARKER SAFETY BLADES SIZE 20, STERILE

## (undated) DEVICE — C-ARMOR C-ARM EQUIPMENT COVERS CLEAR STERILE UNIVERSAL FIT 12 PER CASE: Brand: C-ARMOR

## (undated) DEVICE — POWDER HEMOSTAT GEL 1GR --

## (undated) DEVICE — GELFOAM SZ 100 SPNG

## (undated) DEVICE — KIT CLN UP BON SECOURS MARYV

## (undated) DEVICE — DRAIN SURG W7MMXL20CM SIL FULL PERF HUBLESS FLAT RADPQ STRP

## (undated) DEVICE — ELECTRODE EMG NDL M5 MOD MEP DISP NVM5

## (undated) DEVICE — HEX-LOCKING BLADE ELECTRODE: Brand: EDGE

## (undated) DEVICE — NEEDLE NRV STIM BVL TIP INSUL PEDCL ACCS SYS FOR EMG MON

## (undated) DEVICE — Device

## (undated) DEVICE — BIPOLAR FORCEPS CORD: Brand: VALLEYLAB

## (undated) DEVICE — K WIRE FIX NIT BVL BLNT TIP PRECEPT
Type: IMPLANTABLE DEVICE | Site: SPINE LUMBAR | Status: NON-FUNCTIONAL
Removed: 2021-03-29

## (undated) DEVICE — DRAPE XR C ARM 41X74IN LF --

## (undated) DEVICE — SUTURE VCRL SZ 2-0 L18IN ABSRB VLT CT-1 L36MM 1/2 CIR J739D

## (undated) DEVICE — SUTURE STRATAFIX SYMMETRIC PDS + SZ 2-0 L18IN ABSRB VLT SXPP1A403

## (undated) DEVICE — GAUZE,SPONGE,4"X4",16PLY,STRL,LF,10/TRAY: Brand: MEDLINE

## (undated) DEVICE — BLADE SURG FASCIAL SPL RELINE MAS

## (undated) DEVICE — THE MILL DISPOSABLE - FINE

## (undated) DEVICE — PREP SKN CHLRAPRP APL 26ML STR --

## (undated) DEVICE — TABLE COVER: Brand: CONVERTORS

## (undated) DEVICE — KIT RETRCT SHIM DISP FOR MINIMAL ACCS SYS MAXCESS 4

## (undated) DEVICE — KIT DIL PRB K WIRE DISP FOR EXTRM LUM INTBDY FUS

## (undated) DEVICE — SUTURE VCRL SZ 1 L18IN ABSRB VLT CTX L48MM 1/2 CIR J765D

## (undated) DEVICE — WAX SURG 2.5GM HEMSTAT BNE BEESWAX PARAFFIN ISO PALMITATE

## (undated) DEVICE — 3.0MM PRECISION NEURO (MATCH HEAD)